# Patient Record
Sex: MALE | Race: WHITE | NOT HISPANIC OR LATINO | URBAN - NONMETROPOLITAN AREA
[De-identification: names, ages, dates, MRNs, and addresses within clinical notes are randomized per-mention and may not be internally consistent; named-entity substitution may affect disease eponyms.]

---

## 2021-11-29 PROCEDURE — U0004 COV-19 TEST NON-CDC HGH THRU: HCPCS | Performed by: EMERGENCY MEDICINE

## 2024-06-13 ENCOUNTER — LAB (OUTPATIENT)
Dept: LAB | Facility: HOSPITAL | Age: 39
End: 2024-06-13
Payer: COMMERCIAL

## 2024-06-13 ENCOUNTER — OFFICE VISIT (OUTPATIENT)
Dept: PSYCHIATRY | Facility: CLINIC | Age: 39
End: 2024-06-13
Payer: COMMERCIAL

## 2024-06-13 VITALS
DIASTOLIC BLOOD PRESSURE: 78 MMHG | BODY MASS INDEX: 23.79 KG/M2 | HEIGHT: 68 IN | WEIGHT: 157 LBS | OXYGEN SATURATION: 99 % | HEART RATE: 96 BPM | SYSTOLIC BLOOD PRESSURE: 118 MMHG

## 2024-06-13 DIAGNOSIS — Z78.9 ALCOHOL USE: ICD-10-CM

## 2024-06-13 DIAGNOSIS — F15.21 METHAMPHETAMINE USE DISORDER, SEVERE, IN SUSTAINED REMISSION: ICD-10-CM

## 2024-06-13 DIAGNOSIS — F12.10 CANNABIS USE DISORDER, MILD, ABUSE: ICD-10-CM

## 2024-06-13 DIAGNOSIS — F11.21 OPIOID USE DISORDER, SEVERE, IN SUSTAINED REMISSION: ICD-10-CM

## 2024-06-13 LAB
AMPHET+METHAMPHET UR QL: NEGATIVE
AMPHETAMINES UR QL: NEGATIVE
BARBITURATES UR QL SCN: NEGATIVE
BENZODIAZ UR QL SCN: NEGATIVE
BUPRENORPHINE SERPL-MCNC: NEGATIVE NG/ML
CANNABINOIDS SERPL QL: NEGATIVE
COCAINE UR QL: NEGATIVE
METHADONE UR QL SCN: NEGATIVE
OPIATES UR QL: NEGATIVE
OXYCODONE UR QL SCN: NEGATIVE
PCP UR QL SCN: NEGATIVE
TRICYCLICS UR QL SCN: NEGATIVE

## 2024-06-13 PROCEDURE — 80306 DRUG TEST PRSMV INSTRMNT: CPT
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
## 2024-06-18 LAB — REF LAB TEST METHOD: NORMAL

## 2024-06-20 ENCOUNTER — LAB (OUTPATIENT)
Dept: LAB | Facility: HOSPITAL | Age: 39
End: 2024-06-20
Payer: COMMERCIAL

## 2024-06-20 ENCOUNTER — OFFICE VISIT (OUTPATIENT)
Dept: PSYCHIATRY | Facility: CLINIC | Age: 39
End: 2024-06-20
Payer: COMMERCIAL

## 2024-06-20 DIAGNOSIS — F12.10 CANNABIS USE DISORDER, MILD, ABUSE: ICD-10-CM

## 2024-06-20 DIAGNOSIS — F11.21 OPIOID USE DISORDER, SEVERE, IN SUSTAINED REMISSION: Primary | ICD-10-CM

## 2024-06-20 DIAGNOSIS — F15.21 METHAMPHETAMINE USE DISORDER, SEVERE, IN SUSTAINED REMISSION: ICD-10-CM

## 2024-06-20 DIAGNOSIS — Z78.9 ALCOHOL USE: ICD-10-CM

## 2024-06-20 DIAGNOSIS — F12.10 MILD CANNABIS USE DISORDER: ICD-10-CM

## 2024-06-20 DIAGNOSIS — F11.21 OPIOID USE DISORDER, SEVERE, IN SUSTAINED REMISSION: ICD-10-CM

## 2024-06-20 PROCEDURE — 90791 PSYCH DIAGNOSTIC EVALUATION: CPT | Performed by: SOCIAL WORKER

## 2024-06-20 PROCEDURE — 80306 DRUG TEST PRSMV INSTRMNT: CPT
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
## 2024-06-27 ENCOUNTER — DOCUMENTATION (OUTPATIENT)
Dept: PSYCHIATRY | Facility: CLINIC | Age: 39
End: 2024-06-27
Payer: COMMERCIAL

## 2024-06-27 ENCOUNTER — TELEPHONE (OUTPATIENT)
Dept: PSYCHIATRY | Facility: CLINIC | Age: 39
End: 2024-06-27
Payer: COMMERCIAL

## 2024-06-27 NOTE — PROGRESS NOTES
Spoke with pt. Clinical team is allowing him to start in Phase 2 of IOP. Will start Monday 7/1/2024 in 4:30 group.

## 2024-06-27 NOTE — TELEPHONE ENCOUNTER
Gadiel called in about his UDS results, and to see when he is able to start IOP. He has asked for a return call.

## 2024-07-01 ENCOUNTER — OFFICE VISIT (OUTPATIENT)
Dept: PSYCHIATRY | Facility: CLINIC | Age: 39
End: 2024-07-01
Payer: COMMERCIAL

## 2024-07-01 DIAGNOSIS — Z78.9 ALCOHOL USE: ICD-10-CM

## 2024-07-01 DIAGNOSIS — F15.21 METHAMPHETAMINE USE DISORDER, SEVERE, IN SUSTAINED REMISSION: ICD-10-CM

## 2024-07-01 DIAGNOSIS — F12.10 MILD CANNABIS USE DISORDER: ICD-10-CM

## 2024-07-01 DIAGNOSIS — F11.21 OPIOID USE DISORDER, SEVERE, IN SUSTAINED REMISSION: Primary | ICD-10-CM

## 2024-07-01 PROCEDURE — 90853 GROUP PSYCHOTHERAPY: CPT | Performed by: COUNSELOR

## 2024-07-02 ENCOUNTER — LAB (OUTPATIENT)
Dept: LAB | Facility: HOSPITAL | Age: 39
End: 2024-07-02
Payer: COMMERCIAL

## 2024-07-02 DIAGNOSIS — F12.10 CANNABIS USE DISORDER, MILD, ABUSE: ICD-10-CM

## 2024-07-02 DIAGNOSIS — Z78.9 ALCOHOL USE: ICD-10-CM

## 2024-07-02 DIAGNOSIS — F11.21 OPIOID USE DISORDER, SEVERE, IN SUSTAINED REMISSION: ICD-10-CM

## 2024-07-02 DIAGNOSIS — F15.21 METHAMPHETAMINE USE DISORDER, SEVERE, IN SUSTAINED REMISSION: ICD-10-CM

## 2024-07-02 PROCEDURE — 80306 DRUG TEST PRSMV INSTRMNT: CPT

## 2024-07-02 NOTE — PROGRESS NOTES
"[facility] PHASE II / Phase III GROUP NOTE    Name: Gadiel Short  Date: July 1, 2024    Time in: 4:30     Time out:?5:30     Participants: 12          Data: 1?hour group therapy session (Check ins and interviewing you past and future, and coping skills)      Clinical Maneuvering/Interventions:?Therapist utilized a person-centered, Motivation interviewing, and CBT approaches to build rapport, exploring and resolving ambivalence associated with commitment to change behaviors related to substance use with and addiction, group member.?Therapist implemented motivational interviewing techniques to assist client with, facilitate identification of maladaptive patterns of thinking and behavior that contribute to client's risk for continued substance use and relapse.?Therapist employed group interaction activities to build rapport among group members, promote sobriety, and emphasize relapse prevention.?Therapist promoted a safe nonjudgmental environment by providing group members with unconditional positive regard and encouraging group members to comply with group rules and guidelines. Therapist assisted group member with identifying and implementing healthier coping strategies.      Response:?Patient attended class in person. Patient participated in completion of check in form. Patient on check in form answered no to question \"currently or since last group meeting,?have you had any suicidal thoughts or plan or intent to hurt yourself”, and patient also answered no to question of \"currently or since your last group meeting, have you had any homicidal thoughts or plan or intent to hurt others\". Patient participated in group discussion by identifying a coping skill and identifying goals that he is trying to accomplish.    On a 1:10 Scale (0-none, 10-high):    Anxiety: 8  Depression: 0  Cravings: 0    Assessment     Diagnoses and all orders for this visit:    1. Opioid use disorder, severe, in sustained remission (Primary)    2. " Methamphetamine use disorder, severe, in sustained remission    3. Mild cannabis use disorder    4. Alcohol use             Progress toward goal: At goal    Functional Status: Moderate impairment     Prognosis: Good with Ongoing Treatment     Mental Status Exam:   Hygiene:   good  Cooperation:  Cooperative  Eye Contact:  Good  Psychomotor Behavior:  Appropriate  Affect:  Appropriate  Mood: anxious  Speech:  Normal  Thought Process:  Linear  Thought Content:  Mood congruent  Suicidal:  None  Homicidal:  None  Hallucinations:  None  Delusion:  None  Memory:  Intact  Orientation:  Person, Place, Time, and Situation  Reliability:  fair  Insight:  Fair  Judgement:  Fair  Impulse Control:  Fair  Physical/Medical Issues:  No      Assessment:  Engaged in activity/Process and self-disclosed: Yes  Affect:Appropriate   Applies topic to self: Yes  Able to give and receive feedback: Yes    Urinalysis: Negative  on prelim result dated 06/20/2024  Labs:   Last Urine Toxicity  More data may exist         Latest Ref Rng & Units 6/20/2024 6/13/2024   LAST URINE TOXICITY RESULTS   Amphetamine, Urine Qual Negative Negative  Negative    Barbiturates Screen, Urine Negative Negative  Negative    Benzodiazepine Screen, Urine Negative Negative  Negative    Buprenorphine, Screen, Urine Negative Negative  Negative    Cocaine Screen, Urine Negative Negative  Negative    Methadone Screen , Urine Negative Negative  Negative    Methamphetamine, Ur Negative Negative  Negative         Self-Reported days since last use: 70 plus days    12-Step attendance: 0 meetings    Plan:   Patient will continue in weekly group psychotherapy sessions and individual outpatient psychotherapy sessions every 3-4 weeks and will continue in self-help meetings and seek additional treatment if necessary following completion.    Patient will continue in IOP Phase two and three group.      Safety plan has previously been discussed with patient.     Xu Garcia,  CINTHYA  [unfilled]  18:27 EDT

## 2024-07-05 LAB — REF LAB TEST METHOD: NORMAL

## 2024-07-08 ENCOUNTER — DOCUMENTATION (OUTPATIENT)
Dept: PSYCHIATRY | Facility: HOSPITAL | Age: 39
End: 2024-07-08
Payer: COMMERCIAL

## 2024-07-08 ENCOUNTER — OFFICE VISIT (OUTPATIENT)
Dept: PSYCHIATRY | Facility: CLINIC | Age: 39
End: 2024-07-08
Payer: COMMERCIAL

## 2024-07-08 DIAGNOSIS — Z78.9 ALCOHOL USE: ICD-10-CM

## 2024-07-08 DIAGNOSIS — F15.21 METHAMPHETAMINE USE DISORDER, SEVERE, IN SUSTAINED REMISSION: ICD-10-CM

## 2024-07-08 DIAGNOSIS — F11.21 OPIOID USE DISORDER, SEVERE, IN SUSTAINED REMISSION: Primary | ICD-10-CM

## 2024-07-08 DIAGNOSIS — F12.10 MILD CANNABIS USE DISORDER: ICD-10-CM

## 2024-07-08 LAB — REF LAB TEST METHOD: NORMAL

## 2024-07-08 PROCEDURE — 90853 GROUP PSYCHOTHERAPY: CPT | Performed by: COUNSELOR

## 2024-07-08 NOTE — PROGRESS NOTES
Patient completed CD-IOP Phase I intake on 6/20/2024. On 6/24/2024, patient completed a UDS which showed negative results. Patient at this time will be allowed to start in CD-IOP Phase II at [facility]. Should it be needed in the future, patient will be allowed to be reassessed for CD-IOP Phase I with a new intake.     -Fredrick Suazo LCSW.   7/8/2024.

## 2024-07-09 NOTE — PROGRESS NOTES
"[facility] PHASE II / Phase III GROUP NOTE    Name: Gadiel Short  Date: July 8, 2024    Time in:?4:30   Time out:?5:30   Participants: 10     Data: 1?hour group therapy session (Check ins and self sabotage)      Clinical Maneuvering/Interventions:?Therapist utilized a person-centered, Motivation interviewing, and CBT approaches to build rapport, exploring and resolving ambivalence associated with commitment to change behaviors related to substance use with and addiction, group member.?Therapist implemented motivational interviewing techniques to assist client with, facilitate identification of maladaptive patterns of thinking and behavior that contribute to client's risk for continued substance use and relapse.?Therapist employed group interaction activities to build rapport among group members, promote sobriety, and emphasize relapse prevention.?Therapist promoted a safe nonjudgmental environment by providing group members with unconditional positive regard and encouraging group members to comply with group rules and guidelines. Therapist assisted group members with identifying and implementing healthier coping strategies.       Response:?Patient attended class in person. Patient participated in completion of check in form. Patient on check in form answered no to question \"currently or since last group meeting,?have you had any suicidal thoughts or plan or intent to hurt yourself”, and patient also answered no to question of \"currently or since your last group meeting, have you had any homicidal thoughts or plan or intent to hurt others\".  Patient participated fully in group discussion by openly sharing times that he struggled with self-sabotaging behaviors and providing insight to situations that he has to overcome in his personal life.  Patient identified some coping skills that he is utilizing to help him in growing in recovery.    On a 1:10 Scale (0-none, 10-high):    Anxiety: 2  Depression: 0  Cravings: " 0    Assessment     Diagnoses and all orders for this visit:    1. Opioid use disorder, severe, in sustained remission (Primary)    2. Methamphetamine use disorder, severe, in sustained remission    3. Mild cannabis use disorder    4. Alcohol use             Progress toward goal: At goal    Functional Status: Moderate impairment     Prognosis: Good with Ongoing Treatment     Mental Status Exam:   Hygiene:   good  Cooperation:  Cooperative  Eye Contact:  Good  Psychomotor Behavior:  Appropriate  Affect:  Appropriate  Mood: normal  Speech:  Normal  Thought Process:  Linear  Thought Content:  Mood congruent  Suicidal:  None  Homicidal:  None  Hallucinations:  None  Delusion:  None  Memory:  Intact  Orientation:  Person, Place, Time, and Situation  Reliability:  fair  Insight:  Fair  Judgement:  Fair  Impulse Control:  Fair  Physical/Medical Issues:  No      Assessment:  Engaged in activity/Process and self-disclosed: Yes  Affect:Appropriate   Applies topic to self: Yes  Able to give and receive feedback: Yes    Urinalysis: Negative on confirmation result dated 7/2/2024  Labs:   Last Urine Toxicity  More data exists         Latest Ref Rng & Units 7/2/2024 6/20/2024   LAST URINE TOXICITY RESULTS   Amphetamine, Urine Qual Negative Negative  Negative    Barbiturates Screen, Urine Negative Negative  Negative    Benzodiazepine Screen, Urine Negative Negative  Negative    Buprenorphine, Screen, Urine Negative Negative  Negative    Cocaine Screen, Urine Negative Negative  Negative    Methadone Screen , Urine Negative Negative  Negative    Methamphetamine, Ur Negative Negative  Negative         Self-Reported days since last use: 5 years for other substances and 100 days for alcohol    12-Step attendance: 0 meetings    Plan:   Patient will continue in weekly group psychotherapy sessions and individual outpatient psychotherapy sessions every 3-4 weeks and will continue in self-help meetings and seek additional treatment if  necessary following completion.    Patient will continue in IOP Phase two and three group.      Safety plan has previously been discussed with patient.     CINTHYA Healy  [unfilled]  10:43 EDT

## 2024-07-11 ENCOUNTER — LAB (OUTPATIENT)
Dept: LAB | Facility: HOSPITAL | Age: 39
End: 2024-07-11
Payer: COMMERCIAL

## 2024-07-11 ENCOUNTER — OFFICE VISIT (OUTPATIENT)
Dept: PSYCHIATRY | Facility: CLINIC | Age: 39
End: 2024-07-11
Payer: COMMERCIAL

## 2024-07-11 DIAGNOSIS — F11.21 OPIOID USE DISORDER, SEVERE, IN SUSTAINED REMISSION: ICD-10-CM

## 2024-07-11 DIAGNOSIS — F15.21 METHAMPHETAMINE USE DISORDER, SEVERE, IN SUSTAINED REMISSION: ICD-10-CM

## 2024-07-11 DIAGNOSIS — F11.21 OPIOID USE DISORDER, SEVERE, IN SUSTAINED REMISSION: Primary | ICD-10-CM

## 2024-07-11 DIAGNOSIS — Z78.9 ALCOHOL USE: ICD-10-CM

## 2024-07-11 DIAGNOSIS — F12.10 CANNABIS USE DISORDER, MILD, ABUSE: ICD-10-CM

## 2024-07-11 PROCEDURE — 90853 GROUP PSYCHOTHERAPY: CPT | Performed by: COUNSELOR

## 2024-07-11 PROCEDURE — 80306 DRUG TEST PRSMV INSTRMNT: CPT

## 2024-07-11 NOTE — PROGRESS NOTES
"[facility] PHASE II / Phase III GROUP NOTE    Name: Gadiel Short  Date: July 11, 2024    Time in:?4:30   Time out:?5:30   Participants: 9     Data: 1?hour group therapy session (Check ins, anxiety activity, and coping skills)      Clinical Maneuvering/Interventions:?Therapist utilized a person-centered, Motivation interviewing, and CBT approaches to build rapport, exploring and resolving ambivalence associated with commitment to change behaviors related to substance use with and addiction, group member.?Therapist implemented motivational interviewing techniques to assist client with, facilitate identification of maladaptive patterns of thinking and behavior that contribute to client's risk for continued substance use and relapse.?Therapist employed group interaction activities to build rapport among group members, promote sobriety, and emphasize relapse prevention.?Therapist promoted a safe nonjudgmental environment by providing group members with unconditional positive regard and encouraging group members to comply with group rules and guidelines. Therapist assisted group members with identifying and implementing healthier coping strategies.       Response:?Patient attended class in person. Patient participated in completion of check in form. Patient on check in form answered no to question \"currently or since last group meeting,?have you had any suicidal thoughts or plan or intent to hurt yourself”, and patient also answered no to question of \"currently or since your last group meeting, have you had any homicidal thoughts or plan or intent to hurt others\". Patient openly shared some anxiety triggers and coping skills.    On a 1:10 Scale (0-none, 10-high):    Anxiety: 0  Depression: 1  Cravings: 0    Assessment     Diagnoses and all orders for this visit:    1. Opioid use disorder, severe, in sustained remission (Primary)    2. Methamphetamine use disorder, severe, in sustained remission             Progress toward goal: " At goal    Functional Status: Moderate impairment     Prognosis: Good with Ongoing Treatment     Mental Status Exam:   Hygiene:   good  Cooperation:  Cooperative  Eye Contact:  Good  Psychomotor Behavior:  Appropriate  Affect:  Appropriate  Mood: normal  Speech:  Normal  Thought Process:  Linear  Thought Content:  Mood congruent  Suicidal:  None  Homicidal:  None  Hallucinations:  None  Delusion:  None  Memory:  Intact  Orientation:  Person, Place, Time, and Situation  Reliability:  fair  Insight:  Fair  Judgement:  Fair  Impulse Control:  Fair  Physical/Medical Issues:  No      Assessment:  Engaged in activity/Process and self-disclosed: Yes  Affect:Appropriate   Applies topic to self: Yes  Able to give and receive feedback: Yes    Urinalysis: Negative  on prelim result dated 07/11/2024  Labs:   Last Urine Toxicity  More data exists         Latest Ref Rng & Units 7/11/2024 7/2/2024   LAST URINE TOXICITY RESULTS   Amphetamine, Urine Qual Negative Negative  Negative    Barbiturates Screen, Urine Negative Negative  Negative    Benzodiazepine Screen, Urine Negative Negative  Negative    Buprenorphine, Screen, Urine Negative Negative  Negative    Cocaine Screen, Urine Negative Negative  Negative    Methadone Screen , Urine Negative Negative  Negative    Methamphetamine, Ur Negative Negative  Negative         Self-Reported days since last use: over 100 day for alcohol and almost 5 years from drugs    12-Step attendance: 0 meetings    Plan:   Patient will continue in weekly group psychotherapy sessions and individual outpatient psychotherapy sessions every 3-4 weeks and will continue in self-help meetings and seek additional treatment if necessary following completion.    Patient will continue in IOP Phase two and three group.      Safety plan has previously been discussed with patient.     CINTHYA Healy  [unfilled]  18:41 EDT

## 2024-07-15 ENCOUNTER — OFFICE VISIT (OUTPATIENT)
Dept: PSYCHIATRY | Facility: CLINIC | Age: 39
End: 2024-07-15
Payer: COMMERCIAL

## 2024-07-15 ENCOUNTER — LAB (OUTPATIENT)
Dept: LAB | Facility: HOSPITAL | Age: 39
End: 2024-07-15
Payer: COMMERCIAL

## 2024-07-15 DIAGNOSIS — F12.10 MILD CANNABIS USE DISORDER: ICD-10-CM

## 2024-07-15 DIAGNOSIS — Z78.9 ALCOHOL USE: ICD-10-CM

## 2024-07-15 DIAGNOSIS — F15.21 METHAMPHETAMINE USE DISORDER, SEVERE, IN SUSTAINED REMISSION: ICD-10-CM

## 2024-07-15 DIAGNOSIS — F12.10 CANNABIS USE DISORDER, MILD, ABUSE: ICD-10-CM

## 2024-07-15 DIAGNOSIS — F11.21 OPIOID USE DISORDER, SEVERE, IN SUSTAINED REMISSION: Primary | ICD-10-CM

## 2024-07-15 DIAGNOSIS — F11.21 OPIOID USE DISORDER, SEVERE, IN SUSTAINED REMISSION: ICD-10-CM

## 2024-07-15 PROCEDURE — 90853 GROUP PSYCHOTHERAPY: CPT | Performed by: COUNSELOR

## 2024-07-15 PROCEDURE — 80306 DRUG TEST PRSMV INSTRMNT: CPT

## 2024-07-16 NOTE — PROGRESS NOTES
"[facility] [place] PHASE II / Phase III GROUP NOTE    Name: Gadiel Short  Date: July 15, 2024    Time in: 4:30   Time out:?5:30   Participants: 9     Data: 1?hour group therapy session (Check ins, and jeopardy review over fair fighting rules and self-sabotage in addiction)      Clinical Maneuvering/Interventions:?Therapist utilized a person-centered, Motivation interviewing, and CBT approaches to build rapport, exploring and resolving ambivalence associated with commitment to change behaviors related to substance use with and addiction, group member.?Therapist implemented motivational interviewing techniques to assist client with, facilitate identification of maladaptive patterns of thinking and behavior that contribute to client's risk for continued substance use and relapse.?Therapist employed group interaction activities to build rapport among group members, promote sobriety, and emphasize relapse prevention.?Therapist promoted a safe nonjudgmental environment by providing group members with unconditional positive regard and encouraging group members to comply with group rules and guidelines. Therapist assisted group member with identifying and implementing healthier coping strategies.      Response:?Patient attended class in person. Patient participated in completion of check in form. Patient on check in form answered no to question \"currently or since last group meeting,?have you had any suicidal thoughts or plan or intent to hurt yourself”, and patient also answered no to question of \"currently or since your last group meeting, have you had any homicidal thoughts or plan or intent to hurt others\".     On a 1:10 Scale (0-none, 10-high):    Anxiety: 5  Depression: 0  Cravings: 0    Assessment     Diagnoses and all orders for this visit:    1. Opioid use disorder, severe, in sustained remission (Primary)    2. Methamphetamine use disorder, severe, in sustained remission    3. Mild cannabis use disorder    4. Alcohol use         "     Progress toward goal: At goal    Functional Status: Moderate impairment     Prognosis: Good with Ongoing Treatment     Mental Status Exam:   Hygiene:   good  Cooperation:  Cooperative  Eye Contact:  Good  Psychomotor Behavior:  Appropriate  Affect:  Appropriate  Mood: normal  Speech:  Normal  Thought Process:  Linear  Thought Content:  Mood congruent  Suicidal:  None  Homicidal:  None  Hallucinations:  None  Delusion:  None  Memory:  Intact  Orientation:  Person, Place, Time, and Situation  Reliability:  fair  Insight:  Fair  Judgement:  Fair  Impulse Control:  Fair  Physical/Medical Issues:  No      Assessment:  Engaged in activity/Process and self-disclosed: Yes  Affect:Appropriate   Applies topic to self: Yes  Able to give and receive feedback: Yes    Urinalysis: Negative on prelim result dated 7/15/2024  Labs:   Last Urine Toxicity  More data exists         Latest Ref Rng & Units 7/15/2024 7/11/2024   LAST URINE TOXICITY RESULTS   Amphetamine, Urine Qual Negative Negative  Negative    Barbiturates Screen, Urine Negative Negative  Negative    Benzodiazepine Screen, Urine Negative Negative  Negative    Buprenorphine, Screen, Urine Negative Negative  Negative    Cocaine Screen, Urine Negative Negative  Negative    Methadone Screen , Urine Negative Negative  Negative    Methamphetamine, Ur Negative Negative  Negative         Self-Reported days since last use: 100+ days from alcohol    12-Step attendance: 0 meetings    Plan:   Patient will continue in weekly group psychotherapy sessions and individual outpatient psychotherapy sessions every 3-4 weeks and will continue in self-help meetings and seek additional treatment if necessary following completion.    Patient will continue in IOP Phase two and three group.      Safety plan has previously been discussed with patient.     CINTHYA Healy  [unfilled]  09:23 EDT

## 2024-07-17 LAB — REF LAB TEST METHOD: NORMAL

## 2024-07-18 ENCOUNTER — OFFICE VISIT (OUTPATIENT)
Dept: PSYCHIATRY | Facility: CLINIC | Age: 39
End: 2024-07-18
Payer: COMMERCIAL

## 2024-07-18 DIAGNOSIS — F11.21 OPIOID USE DISORDER, SEVERE, IN SUSTAINED REMISSION: Primary | ICD-10-CM

## 2024-07-18 DIAGNOSIS — Z78.9 ALCOHOL USE: ICD-10-CM

## 2024-07-18 DIAGNOSIS — F12.10 MILD CANNABIS USE DISORDER: ICD-10-CM

## 2024-07-18 DIAGNOSIS — F15.21 METHAMPHETAMINE USE DISORDER, SEVERE, IN SUSTAINED REMISSION: ICD-10-CM

## 2024-07-18 PROCEDURE — 90853 GROUP PSYCHOTHERAPY: CPT | Performed by: COUNSELOR

## 2024-07-18 NOTE — PROGRESS NOTES
"[facility] PHASE II / Phase III GROUP NOTE    Name: Gadiel Short  Date: July 18, 2024    Time in: 4:30   Time out:?5:30   Participants: 10    Data: 1?hour group therapy session (Check ins, and thoughts, feelings, and actions triangle activity)     Clinical Maneuvering/Interventions:?Therapist utilized a person-centered, Motivation interviewing, and CBT approaches to build rapport, exploring and resolving ambivalence associated with commitment to change behaviors related to substance use with and addiction, group member.?Therapist implemented motivational interviewing techniques to assist client with, facilitate identification of maladaptive patterns of thinking and behavior that contribute to client's risk for continued substance use and relapse.?Therapist employed group interaction activities to build rapport among group members, promote sobriety, and emphasize relapse prevention.?Therapist promoted a safe nonjudgmental environment by providing group members with unconditional positive regard and encouraging group members to comply with group rules and guidelines. Therapist assisted group member with identifying and implementing healthier coping strategies.      Response:?Patient attended class in person. Patient participated in completion of check in form. Patient on check in form answered no to question \"currently or since last group meeting,?have you had any suicidal thoughts or plan or intent to hurt yourself”, and patient also answered no to question of \"currently or since your last group meeting, have you had any homicidal thoughts or plan or intent to hurt others\". Patient openly shared different thoughts, feelings, and actions that he experiences. Patient identified words of affirmations are helpful to having more positive thoughts, feelings, and actions.    On a 1:10 Scale (0-none, 10-high):    Anxiety: 0  Depression: 0  Cravings: 0    Assessment     Diagnoses and all orders for this visit:    1. Opioid use " disorder, severe, in sustained remission (Primary)    2. Methamphetamine use disorder, severe, in sustained remission    3. Mild cannabis use disorder    4. Alcohol use             Progress toward goal: At goal    Functional Status: Moderate impairment     Prognosis: Good with Ongoing Treatment     Mental Status Exam:   Hygiene:   good  Cooperation:  Cooperative  Eye Contact:  Good  Psychomotor Behavior:  Appropriate  Affect:  Appropriate  Mood: normal  Speech:  Normal  Thought Process:  Linear  Thought Content:  Mood congruent  Suicidal:  None  Homicidal:  None  Hallucinations:  None  Delusion:  None  Memory:  Intact  Orientation:  Person, Place, Time, and Situation  Reliability:  fair  Insight:  Fair  Judgement:  Fair  Impulse Control:  Fair  Physical/Medical Issues:  No      Assessment:  Engaged in activity/Process and self-disclosed: Yes  Affect:Appropriate   Applies topic to self: Yes  Able to give and receive feedback: Yes    Urinalysis: Negative on prelim result dated 07/15/2024  Labs:   Last Urine Toxicity  More data exists         Latest Ref Rng & Units 7/15/2024 7/11/2024   LAST URINE TOXICITY RESULTS   Amphetamine, Urine Qual Negative Negative  Negative    Barbiturates Screen, Urine Negative Negative  Negative    Benzodiazepine Screen, Urine Negative Negative  Negative    Buprenorphine, Screen, Urine Negative Negative  Negative    Cocaine Screen, Urine Negative Negative  Negative    Methadone Screen , Urine Negative Negative  Negative    Methamphetamine, Ur Negative Negative  Negative         Self-Reported days since last use: 100+ days from alcohol    12-Step attendance: 0 meetings    Plan:   Patient will continue in weekly group psychotherapy sessions and individual outpatient psychotherapy sessions every 3-4 weeks and will continue in self-help meetings and seek additional treatment if necessary following completion.    Patient will continue in IOP Phase two and three group.      Safety plan has  previously been discussed with patient.     CINTHYA Healy  [unfilled]  19:30 EDT

## 2024-07-22 ENCOUNTER — OFFICE VISIT (OUTPATIENT)
Dept: PSYCHIATRY | Facility: CLINIC | Age: 39
End: 2024-07-22
Payer: COMMERCIAL

## 2024-07-22 DIAGNOSIS — F11.21 OPIOID USE DISORDER, SEVERE, IN SUSTAINED REMISSION: Primary | ICD-10-CM

## 2024-07-22 DIAGNOSIS — F15.21 METHAMPHETAMINE USE DISORDER, SEVERE, IN SUSTAINED REMISSION: ICD-10-CM

## 2024-07-22 DIAGNOSIS — F12.10 MILD CANNABIS USE DISORDER: ICD-10-CM

## 2024-07-22 DIAGNOSIS — Z78.9 ALCOHOL USE: ICD-10-CM

## 2024-07-22 LAB — REF LAB TEST METHOD: NORMAL

## 2024-07-22 PROCEDURE — 90853 GROUP PSYCHOTHERAPY: CPT | Performed by: COUNSELOR

## 2024-07-23 NOTE — PROGRESS NOTES
"[facility] PHASE II / Phase III GROUP NOTE    Name: Gadiel Short  Date: July 22, 2024    Time in: 4:30   Time out:?5:30   Participants: 11     Data: 1?hour group therapy session (Check ins, and thoughts, feelings, and actions triangle activity)     Clinical Maneuvering/Interventions:?Therapist utilized a person-centered, Motivation interviewing, and CBT approaches to build rapport, exploring and resolving ambivalence associated with commitment to change behaviors related to substance use with and addiction, group member.?Therapist implemented motivational interviewing techniques to assist client with, facilitate identification of maladaptive patterns of thinking and behavior that contribute to client's risk for continued substance use and relapse.?Therapist employed group interaction activities to build rapport among group members, promote sobriety, and emphasize relapse prevention.?Therapist promoted a safe nonjudgmental environment by providing group members with unconditional positive regard and encouraging group members to comply with group rules and guidelines. Therapist assisted group member with identifying and implementing healthier coping strategies.      Response:?Patient attended class in person. Patient participated in completion of check in form. Patient on check in form answered no to question \"currently or since last group meeting,?have you had any suicidal thoughts or plan or intent to hurt yourself”, and patient also answered no to question of \"currently or since your last group meeting, have you had any homicidal thoughts or plan or intent to hurt others\". Patient openly shared thoughts about the video and what actions steps that can assist with improving skills in recovery and building connection.     On a 1:10 Scale (0-none, 10-high):    Anxiety: 0  Depression: 0  Cravings: 0    Assessment     Diagnoses and all orders for this visit:    1. Opioid use disorder, severe, in sustained remission " (Primary)    2. Methamphetamine use disorder, severe, in sustained remission    3. Mild cannabis use disorder    4. Alcohol use             Progress toward goal: At goal    Functional Status: Moderate impairment     Prognosis: Good with Ongoing Treatment     Mental Status Exam:   Hygiene:   good  Cooperation:  Cooperative  Eye Contact:  Good  Psychomotor Behavior:  Appropriate  Affect:  Appropriate  Mood: normal  Speech:  Normal  Thought Process:  Linear  Thought Content:  Mood congruent  Suicidal:  None  Homicidal:  None  Hallucinations:  None  Delusion:  None  Memory:  Intact  Orientation:  Person, Place, Time, and Situation  Reliability:  fair  Insight:  Fair  Judgement:  Fair  Impulse Control:  Fair  Physical/Medical Issues:  No      Assessment:  Engaged in activity/Process and self-disclosed: Yes  Affect:Appropriate   Applies topic to self: Yes  Able to give and receive feedback: Yes    Urinalysis: Negative on confirmation result dated 7/15/2024  Labs:   Last Urine Toxicity  More data exists         Latest Ref Rng & Units 7/15/2024 7/11/2024   LAST URINE TOXICITY RESULTS   Amphetamine, Urine Qual Negative Negative  Negative    Barbiturates Screen, Urine Negative Negative  Negative    Benzodiazepine Screen, Urine Negative Negative  Negative    Buprenorphine, Screen, Urine Negative Negative  Negative    Cocaine Screen, Urine Negative Negative  Negative    Methadone Screen , Urine Negative Negative  Negative    Methamphetamine, Ur Negative Negative  Negative         Self-Reported days since last use: 100+ from alcohol    12-Step attendance: 0 meetings    Plan:   Patient will continue in weekly group psychotherapy sessions and individual outpatient psychotherapy sessions every 3-4 weeks and will continue in self-help meetings and seek additional treatment if necessary following completion.    Patient will continue in IOP Phase two and three group.      Safety plan has previously been discussed with patient.      Xu Garcia, CINTHYA  [unfilled]  07:24 EDT

## 2024-07-25 ENCOUNTER — LAB (OUTPATIENT)
Dept: LAB | Facility: HOSPITAL | Age: 39
End: 2024-07-25
Payer: COMMERCIAL

## 2024-07-25 ENCOUNTER — OFFICE VISIT (OUTPATIENT)
Dept: PSYCHIATRY | Facility: CLINIC | Age: 39
End: 2024-07-25
Payer: COMMERCIAL

## 2024-07-25 DIAGNOSIS — F11.21 OPIOID USE DISORDER, SEVERE, IN SUSTAINED REMISSION: Primary | ICD-10-CM

## 2024-07-25 DIAGNOSIS — Z78.9 ALCOHOL USE: ICD-10-CM

## 2024-07-25 DIAGNOSIS — F12.10 CANNABIS USE DISORDER, MILD, ABUSE: ICD-10-CM

## 2024-07-25 DIAGNOSIS — F15.21 METHAMPHETAMINE USE DISORDER, SEVERE, IN SUSTAINED REMISSION: ICD-10-CM

## 2024-07-25 DIAGNOSIS — F11.21 OPIOID USE DISORDER, SEVERE, IN SUSTAINED REMISSION: ICD-10-CM

## 2024-07-25 DIAGNOSIS — F12.10 MILD CANNABIS USE DISORDER: ICD-10-CM

## 2024-07-25 PROCEDURE — 80306 DRUG TEST PRSMV INSTRMNT: CPT

## 2024-07-25 PROCEDURE — 90853 GROUP PSYCHOTHERAPY: CPT | Performed by: COUNSELOR

## 2024-07-29 ENCOUNTER — OFFICE VISIT (OUTPATIENT)
Dept: PSYCHIATRY | Facility: CLINIC | Age: 39
End: 2024-07-29
Payer: COMMERCIAL

## 2024-07-29 ENCOUNTER — LAB (OUTPATIENT)
Dept: LAB | Facility: HOSPITAL | Age: 39
End: 2024-07-29
Payer: COMMERCIAL

## 2024-07-29 DIAGNOSIS — F15.21 METHAMPHETAMINE USE DISORDER, SEVERE, IN SUSTAINED REMISSION: ICD-10-CM

## 2024-07-29 DIAGNOSIS — F11.21 OPIOID USE DISORDER, SEVERE, IN SUSTAINED REMISSION: Primary | ICD-10-CM

## 2024-07-29 DIAGNOSIS — F12.10 MILD CANNABIS USE DISORDER: ICD-10-CM

## 2024-07-29 DIAGNOSIS — F11.21 OPIOID USE DISORDER, SEVERE, IN SUSTAINED REMISSION: ICD-10-CM

## 2024-07-29 DIAGNOSIS — Z78.9 ALCOHOL USE: ICD-10-CM

## 2024-07-29 DIAGNOSIS — F12.10 CANNABIS USE DISORDER, MILD, ABUSE: ICD-10-CM

## 2024-07-29 LAB
AMPHET+METHAMPHET UR QL: NEGATIVE
AMPHETAMINES UR QL: NEGATIVE
BARBITURATES UR QL SCN: NEGATIVE
BENZODIAZ UR QL SCN: NEGATIVE
BUPRENORPHINE SERPL-MCNC: NEGATIVE NG/ML
CANNABINOIDS SERPL QL: NEGATIVE
COCAINE UR QL: NEGATIVE
METHADONE UR QL SCN: NEGATIVE
OPIATES UR QL: NEGATIVE
OXYCODONE UR QL SCN: NEGATIVE
PCP UR QL SCN: NEGATIVE
REF LAB TEST METHOD: NORMAL
TRICYCLICS UR QL SCN: NEGATIVE

## 2024-07-29 PROCEDURE — 90853 GROUP PSYCHOTHERAPY: CPT | Performed by: COUNSELOR

## 2024-07-29 PROCEDURE — 80306 DRUG TEST PRSMV INSTRMNT: CPT

## 2024-07-29 NOTE — PROGRESS NOTES
"[facility] PHASE II / Phase III GROUP NOTE    Name: Gadiel Short  Date: July 25, 2024    Time in: 4:30   Time out:?5:30   Participants: 9     Data: 1?hour group therapy session (Check ins, and connections in recovery)     Clinical Maneuvering/Interventions:?Therapist utilized a person-centered, Motivation interviewing, and CBT approaches to build rapport, exploring and resolving ambivalence associated with commitment to change behaviors related to substance use with and addiction, group member.?Therapist implemented motivational interviewing techniques to assist client with, facilitate identification of maladaptive patterns of thinking and behavior that contribute to client's risk for continued substance use and relapse.?Therapist employed group interaction activities to build rapport among group members, promote sobriety, and emphasize relapse prevention.?Therapist promoted a safe nonjudgmental environment by providing group members with unconditional positive regard and encouraging group members to comply with group rules and guidelines. Therapist assisted group member with identifying and implementing healthier coping strategies.      Response:?Patient attended class in person. Patient participated in completion of check in form. Patient on check in form answered no to question \"currently or since last group meeting,?have you had any suicidal thoughts or plan or intent to hurt yourself”, and patient also answered no to question of \"currently or since your last group meeting, have you had any homicidal thoughts or plan or intent to hurt others\". Patient openly shared thoughts about the video and what actions steps that can assist with improving skills in recovery and building connection. Patient openly shared about the different types of connections in his life. Patient identified actions that he is taking to grow those connections.     On a 1:10 Scale (0-none, 10-high):    Anxiety: 0  Depression: 0  Cravings: " 0    Assessment     Diagnoses and all orders for this visit:    1. Opioid use disorder, severe, in sustained remission (Primary)    2. Methamphetamine use disorder, severe, in sustained remission    3. Mild cannabis use disorder    4. Alcohol use             Progress toward goal: At goal    Functional Status: Moderate impairment     Prognosis: Good with Ongoing Treatment     Mental Status Exam:   Hygiene:   good  Cooperation:  Cooperative  Eye Contact:  Good  Psychomotor Behavior:  Appropriate  Affect:  Appropriate  Mood: normal  Speech:  Normal  Thought Process:  Linear  Thought Content:  Mood congruent  Suicidal:  None  Homicidal:  None  Hallucinations:  None  Delusion:  None  Memory:  Intact  Orientation:  Person, Place, Time, and Situation  Reliability:  fair  Insight:  Fair  Judgement:  Fair  Impulse Control:  Fair  Physical/Medical Issues:  No      Assessment:  Engaged in activity/Process and self-disclosed: Yes  Affect:Appropriate   Applies topic to self: Yes  Able to give and receive feedback: Yes    Urinalysis: Negative on prelim result dated 07/25/2024  Labs:   Last Urine Toxicity  More data exists         Latest Ref Rng & Units 7/25/2024 7/15/2024   LAST URINE TOXICITY RESULTS   Amphetamine, Urine Qual Negative Negative  Negative    Barbiturates Screen, Urine Negative Negative  Negative    Benzodiazepine Screen, Urine Negative Negative  Negative    Buprenorphine, Screen, Urine Negative Negative  Negative    Cocaine Screen, Urine Negative Negative  Negative    Methadone Screen , Urine Negative Negative  Negative    Methamphetamine, Ur Negative Negative  Negative       Details                    Self-Reported days since last use: 100+ days    12-Step attendance: 0 meetings    Plan:   Patient will continue in weekly group psychotherapy sessions and individual outpatient psychotherapy sessions every 3-4 weeks and will continue in self-help meetings and seek additional treatment if necessary following  completion.    Patient will continue in IOP Phase two and three group.      Safety plan has previously been discussed with patient.     CINTHYA Healy  [unfilled]  07:58 EDT

## 2024-07-30 NOTE — PROGRESS NOTES
"[facility] PHASE II / Phase III GROUP NOTE    Name: Gadiel Short  Date: July 29, 2024    Time in: 4:30   Time out:?5:30   Participants: 11     Data: 1?hour group therapy session (Check ins, and reasons why not to use substance activity)     Clinical Maneuvering/Interventions:?Therapist utilized a person-centered, Motivation interviewing, and CBT approaches to build rapport, exploring and resolving ambivalence associated with commitment to change behaviors related to substance use with and addiction, group member.?Therapist implemented motivational interviewing techniques to assist client with, facilitate identification of maladaptive patterns of thinking and behavior that contribute to client's risk for continued substance use and relapse.?Therapist employed group interaction activities to build rapport among group members, promote sobriety, and emphasize relapse prevention.?Therapist promoted a safe nonjudgmental environment by providing group members with unconditional positive regard and encouraging group members to comply with group rules and guidelines. Therapist assisted group member with identifying and implementing healthier coping strategies.      Response:?Patient attended class in person. Patient participated in completion of check in form. Patient on check in form answered no to question \"currently or since last group meeting,?have you had any suicidal thoughts or plan or intent to hurt yourself”, and patient also answered no to question of \"currently or since your last group meeting, have you had any homicidal thoughts or plan or intent to hurt others\". Patient openly shared thoughts about the video and what actions steps that can assist with improving skills in recovery and building connection. Patient openly shared about the reason why not to use activity and to increase their coping skills with reminders on why he is choosing sobriety.     On a 1:10 Scale (0-none, 10-high):    Anxiety: 0  Depression: " 0  Cravings: 0    Assessment     Diagnoses and all orders for this visit:    1. Opioid use disorder, severe, in sustained remission (Primary)    2. Methamphetamine use disorder, severe, in sustained remission    3. Mild cannabis use disorder    4. Alcohol use             Progress toward goal: At goal    Functional Status: Moderate impairment     Prognosis: Good with Ongoing Treatment     Mental Status Exam:   Hygiene:   good  Cooperation:  Cooperative  Eye Contact:  Good  Psychomotor Behavior:  Appropriate  Affect:  Appropriate  Mood: normal  Speech:  Normal  Thought Process:  Linear  Thought Content:  Mood congruent  Suicidal:  None  Homicidal:  None  Hallucinations:  None  Delusion:  None  Memory:  Intact  Orientation:  Person, Place, Time, and Situation  Reliability:  fair  Insight:  Fair  Judgement:  Fair  Impulse Control:  Fair  Physical/Medical Issues:  No      Assessment:  Engaged in activity/Process and self-disclosed: Yes  Affect:Appropriate   Applies topic to self: Yes  Able to give and receive feedback: Yes    Urinalysis: Negative  on prelim result dated 07/29/2024  Labs:   Last Urine Toxicity  More data exists         Latest Ref Rng & Units 7/29/2024 7/25/2024   LAST URINE TOXICITY RESULTS   Amphetamine, Urine Qual Negative Negative  Negative    Barbiturates Screen, Urine Negative Negative  Negative    Benzodiazepine Screen, Urine Negative Negative  Negative    Buprenorphine, Screen, Urine Negative Negative  Negative    Cocaine Screen, Urine Negative Negative  Negative    Methadone Screen , Urine Negative Negative  Negative    Methamphetamine, Ur Negative Negative  Negative       Details                    Self-Reported days since last use: 120 plus days    12-Step attendance: 0 meetings    Plan:   Patient will continue in weekly group psychotherapy sessions and individual outpatient psychotherapy sessions every 3-4 weeks and will continue in self-help meetings and seek additional treatment if necessary  following completion.    Patient will continue in IOP Phase two and three group.      Safety plan has previously been discussed with patient.     CINTHYA Healy  [unfilled]  09:28 EDT

## 2024-07-31 ENCOUNTER — TELEPHONE (OUTPATIENT)
Dept: PSYCHIATRY | Facility: CLINIC | Age: 39
End: 2024-07-31

## 2024-07-31 NOTE — TELEPHONE ENCOUNTER
Patient called in and states that he was not aware appointment had to be on mychart and he did not have the danny. He said that his social worked needed some assessments of a drug and mental health assessment. He said social workers name was Rylee Rogers number was [phone] she had emailed someone before court today and did not get a response and is wanting these assessments for Patient. Please advise

## 2024-07-31 NOTE — TELEPHONE ENCOUNTER
Called Rylee Rogers she said she had faxed a YESENIA on 07/18/2024 I advised her we had not gotten the fax she said she would refax but that she did want to speak personally with Gadiel's provider.    Patient has also been advised he said he would call tomorrow and if we had not got release he would come in office to sign one.

## 2024-08-01 ENCOUNTER — OFFICE VISIT (OUTPATIENT)
Dept: PSYCHIATRY | Facility: CLINIC | Age: 39
End: 2024-08-01
Payer: COMMERCIAL

## 2024-08-01 DIAGNOSIS — F12.10 MILD CANNABIS USE DISORDER: ICD-10-CM

## 2024-08-01 DIAGNOSIS — F15.21 METHAMPHETAMINE USE DISORDER, SEVERE, IN SUSTAINED REMISSION: ICD-10-CM

## 2024-08-01 DIAGNOSIS — F11.21 OPIOID USE DISORDER, SEVERE, IN SUSTAINED REMISSION: Primary | ICD-10-CM

## 2024-08-01 DIAGNOSIS — Z78.9 ALCOHOL USE: ICD-10-CM

## 2024-08-01 PROCEDURE — 90853 GROUP PSYCHOTHERAPY: CPT | Performed by: COUNSELOR

## 2024-08-01 NOTE — PROGRESS NOTES
"[facility] PHASE II / Phase III GROUP NOTE    Name: Gadiel Short  Date: August 1, 2024    Time in: 4:30   Time out:?5:30   Participants: 10     Data: 1?hour group therapy session (Check ins, and forgiveness, Live, Love activity)     Clinical Maneuvering/Interventions:?Therapist utilized a person-centered, Motivation interviewing, and CBT approaches to build rapport, exploring and resolving ambivalence associated with commitment to change behaviors related to substance use with and addiction, group member.?Therapist implemented motivational interviewing techniques to assist client with, facilitate identification of maladaptive patterns of thinking and behavior that contribute to client's risk for continued substance use and relapse.?Therapist employed group interaction activities to build rapport among group members, promote sobriety, and emphasize relapse prevention.?Therapist promoted a safe nonjudgmental environment by providing group members with unconditional positive regard and encouraging group members to comply with group rules and guidelines. Therapist assisted group member with identifying and implementing healthier coping strategies.      Response:?Patient attended class in person. Patient participated in completion of check in form. Patient on check in form answered no to question \"currently or since last group meeting,?have you had any suicidal thoughts or plan or intent to hurt yourself”, and patient also answered no to question of \"currently or since your last group meeting, have you had any homicidal thoughts or plan or intent to hurt others\". Patient openly shared thoughts about the video and what actions steps that can assist with improving skills in recovery and building connection. Patient expressed some anxiety and depression that he has been experiencing. Patient reported that it was due to a difficult time that he is experiencing. Patient identified some supports and actions he taking to maintain his " sobriety. Patient expressed some resources that he is planning on starting to use and requested if he could come in to complete UDS on Tuesday, Thursday, or Friday due to situations his court order action plan he is trying to meet.    On a 1:10 Scale (0-none, 10-high):    Anxiety: 8  Depression: 7  Cravings: 0    Assessment     Diagnoses and all orders for this visit:    1. Opioid use disorder, severe, in sustained remission (Primary)    2. Methamphetamine use disorder, severe, in sustained remission    3. Mild cannabis use disorder    4. Alcohol use             Progress toward goal: At goal    Functional Status: Moderate impairment     Prognosis: Good with Ongoing Treatment     Mental Status Exam:   Hygiene:   good  Cooperation:  Cooperative  Eye Contact:  Good  Psychomotor Behavior:  Appropriate  Affect:  Appropriate  Mood: normal  Speech:  Normal  Thought Process:  Linear  Thought Content:  Mood congruent  Suicidal:  None  Homicidal:  None  Hallucinations:  None  Delusion:  None  Memory:  Intact  Orientation:  Person, Place, Time, and Situation  Reliability:  fair  Insight:  Fair  Judgement:  Fair  Impulse Control:  Fair  Physical/Medical Issues:  No      Assessment:  Engaged in activity/Process and self-disclosed: Yes  Affect:Appropriate   Applies topic to self: Yes  Able to give and receive feedback: Yes    Urinalysis: Negative  on prelim result dated 07/29/2024  Labs:   Last Urine Toxicity  More data exists         Latest Ref Rng & Units 7/29/2024 7/25/2024   LAST URINE TOXICITY RESULTS   Amphetamine, Urine Qual Negative Negative  Negative    Barbiturates Screen, Urine Negative Negative  Negative    Benzodiazepine Screen, Urine Negative Negative  Negative    Buprenorphine, Screen, Urine Negative Negative  Negative    Cocaine Screen, Urine Negative Negative  Negative    Methadone Screen , Urine Negative Negative  Negative    Methamphetamine, Ur Negative Negative  Negative       Details                     Self-Reported days since last use: 120 plus days    12-Step attendance: 0 meetings    Plan:   Patient will continue in weekly group psychotherapy sessions and individual outpatient psychotherapy sessions every 3-4 weeks and will continue in self-help meetings and seek additional treatment if necessary following completion.    Patient will continue in IOP Phase two and three group.      Safety plan has previously been discussed with patient.     CINTHYA Healy  [unfilled]  19:54 EDT

## 2024-08-02 LAB — REF LAB TEST METHOD: NORMAL

## 2024-08-05 ENCOUNTER — OFFICE VISIT (OUTPATIENT)
Dept: PSYCHIATRY | Facility: CLINIC | Age: 39
End: 2024-08-05
Payer: COMMERCIAL

## 2024-08-05 DIAGNOSIS — F15.21 METHAMPHETAMINE USE DISORDER, SEVERE, IN SUSTAINED REMISSION: ICD-10-CM

## 2024-08-05 DIAGNOSIS — F12.10 MILD CANNABIS USE DISORDER: ICD-10-CM

## 2024-08-05 DIAGNOSIS — F11.21 OPIOID USE DISORDER, SEVERE, IN SUSTAINED REMISSION: Primary | ICD-10-CM

## 2024-08-05 DIAGNOSIS — Z78.9 ALCOHOL USE: ICD-10-CM

## 2024-08-05 PROCEDURE — 90853 GROUP PSYCHOTHERAPY: CPT | Performed by: COUNSELOR

## 2024-08-06 NOTE — PROGRESS NOTES
"[facility] PHASE II / Phase III GROUP NOTE    Name: Gadiel Short  Date: August 5, 2024    Time in: 4:30   Time out:?5:30   Participants: 10     Data: 1?hour group therapy session 1?hour group therapy session (Check ins, and forgiveness, Live, Love activity, team building activity finding good in the bad)     Clinical Maneuvering/Interventions:?Therapist utilized a person-centered, Motivation interviewing, and CBT approaches to build rapport, exploring and resolving ambivalence associated with commitment to change behaviors related to substance use with and addiction, group member.?Therapist implemented motivational interviewing techniques to assist client with, facilitate identification of maladaptive patterns of thinking and behavior that contribute to client's risk for continued substance use and relapse.?Therapist employed group interaction activities to build rapport among group members, promote sobriety, and emphasize relapse prevention.?Therapist promoted a safe nonjudgmental environment by providing group members with unconditional positive regard and encouraging group members to comply with group rules and guidelines. Therapist assisted group member with identifying and implementing healthier coping strategies.      Response:?Patient attended class in person. Patient participated in completion of check in form. Patient on check in form answered no to question \"currently or since last group meeting,?have you had any suicidal thoughts or plan or intent to hurt yourself”, and patient also answered no to question of \"currently or since your last group meeting, have you had any homicidal thoughts or plan or intent to hurt others\". Patient openly shared thoughts about the video and what actions steps that can assist with improving skills in recovery and building connection.  Patient was able to discuss times in his life where he has practiced forgiveness to overcome resentments.  Patient was able to identify some good " from bad events in his life.    On a 1:10 Scale (0-none, 10-high):    Anxiety: 5  Depression: 0  Cravings: 0    Assessment     Diagnoses and all orders for this visit:    1. Opioid use disorder, severe, in sustained remission (Primary)    2. Methamphetamine use disorder, severe, in sustained remission    3. Mild cannabis use disorder    4. Alcohol use             Progress toward goal: At goal    Functional Status: Moderate impairment     Prognosis: Good with Ongoing Treatment     Mental Status Exam:   Hygiene:   good  Cooperation:  Cooperative  Eye Contact:  Good  Psychomotor Behavior:  Appropriate  Affect:  Appropriate  Mood: normal  Speech:  Normal  Thought Process:  Linear  Thought Content:  Mood congruent  Suicidal:  None  Homicidal:  None  Hallucinations:  None  Delusion:  None  Memory:  Intact  Orientation:  Person, Place, Time, and Situation  Reliability:  fair  Insight:  Fair  Judgement:  Fair  Impulse Control:  Fair  Physical/Medical Issues:  No      Assessment:  Engaged in activity/Process and self-disclosed: Yes  Affect:Appropriate   Applies topic to self: Yes  Able to give and receive feedback: Yes    Urinalysis: Negative on confirmation result dated 7/29/2024  Labs:   Last Urine Toxicity  More data exists         Latest Ref Rng & Units 7/29/2024 7/25/2024   LAST URINE TOXICITY RESULTS   Amphetamine, Urine Qual Negative Negative  Negative    Barbiturates Screen, Urine Negative Negative  Negative    Benzodiazepine Screen, Urine Negative Negative  Negative    Buprenorphine, Screen, Urine Negative Negative  Negative    Cocaine Screen, Urine Negative Negative  Negative    Methadone Screen , Urine Negative Negative  Negative    Methamphetamine, Ur Negative Negative  Negative       Details                    Self-Reported days since last use: 120+ days    12-Step attendance: 0 meeting    Plan:   Patient will continue in weekly group psychotherapy sessions and individual outpatient psychotherapy sessions every  3-4 weeks and will continue in self-help meetings and seek additional treatment if necessary following completion.    Patient will continue in IOP Phase two and three group.      Safety plan has previously been discussed with patient.     CINTHYA Healy  [unfilled]  07:49 EDT

## 2024-08-08 ENCOUNTER — LAB (OUTPATIENT)
Dept: LAB | Facility: HOSPITAL | Age: 39
End: 2024-08-08
Payer: COMMERCIAL

## 2024-08-08 ENCOUNTER — OFFICE VISIT (OUTPATIENT)
Dept: PSYCHIATRY | Facility: CLINIC | Age: 39
End: 2024-08-08
Payer: COMMERCIAL

## 2024-08-08 DIAGNOSIS — Z78.9 ALCOHOL USE: ICD-10-CM

## 2024-08-08 DIAGNOSIS — F11.21 OPIOID USE DISORDER, SEVERE, IN SUSTAINED REMISSION: ICD-10-CM

## 2024-08-08 DIAGNOSIS — F15.21 METHAMPHETAMINE USE DISORDER, SEVERE, IN SUSTAINED REMISSION: ICD-10-CM

## 2024-08-08 DIAGNOSIS — F12.10 MILD CANNABIS USE DISORDER: ICD-10-CM

## 2024-08-08 DIAGNOSIS — F12.10 CANNABIS USE DISORDER, MILD, ABUSE: ICD-10-CM

## 2024-08-08 DIAGNOSIS — F11.21 OPIOID USE DISORDER, SEVERE, IN SUSTAINED REMISSION: Primary | ICD-10-CM

## 2024-08-08 LAB
AMPHET+METHAMPHET UR QL: NEGATIVE
AMPHETAMINES UR QL: NEGATIVE
BARBITURATES UR QL SCN: NEGATIVE
BENZODIAZ UR QL SCN: NEGATIVE
BUPRENORPHINE SERPL-MCNC: NEGATIVE NG/ML
CANNABINOIDS SERPL QL: POSITIVE
COCAINE UR QL: NEGATIVE
METHADONE UR QL SCN: NEGATIVE
OPIATES UR QL: NEGATIVE
OXYCODONE UR QL SCN: NEGATIVE
PCP UR QL SCN: NEGATIVE
TRICYCLICS UR QL SCN: NEGATIVE

## 2024-08-08 PROCEDURE — 80306 DRUG TEST PRSMV INSTRMNT: CPT

## 2024-08-08 PROCEDURE — 90853 GROUP PSYCHOTHERAPY: CPT | Performed by: COUNSELOR

## 2024-08-09 NOTE — PROGRESS NOTES
"[facility] PHASE II / Phase III GROUP NOTE    Name: Gadiel Short  Date: August 8, 2024    Time in: 4:30   Time out:?5:30   Participants: 9     Data: 1?hour group therapy session 1?hour group therapy session (Check ins, and individual check ins)     Clinical Maneuvering/Interventions:?Therapist utilized a person-centered, Motivation interviewing, and CBT approaches to build rapport, exploring and resolving ambivalence associated with commitment to change behaviors related to substance use with and addiction, group member.?Therapist implemented motivational interviewing techniques to assist client with, facilitate identification of maladaptive patterns of thinking and behavior that contribute to client's risk for continued substance use and relapse.?Therapist employed group interaction activities to build rapport among group members, promote sobriety, and emphasize relapse prevention.?Therapist promoted a safe nonjudgmental environment by providing group members with unconditional positive regard and encouraging group members to comply with group rules and guidelines. Therapist assisted group member with identifying and implementing healthier coping strategies.      Response:?Patient attended class in person. Patient participated in completion of check in form. Patient on check in form answered no to question \"currently or since last group meeting,?have you had any suicidal thoughts or plan or intent to hurt yourself”, and patient also answered no to question of \"currently or since your last group meeting, have you had any homicidal thoughts or plan or intent to hurt others\". Patient openly shared thoughts about the video and what actions steps that can assist with improving skills in recovery and building connection.  Patient is able to identify some growth due to experience that he has had.  Patient identified some supports that he is leaning on to help him through this tough time of working through his case plan.  Patient " reports that he feels overwhelmed but feels confident that he can complete the task ahead of him.    On a 1:10 Scale (0-none, 10-high):    Anxiety: 2  Depression: 0  Cravings: 0    Assessment     Diagnoses and all orders for this visit:    1. Opioid use disorder, severe, in sustained remission (Primary)    2. Methamphetamine use disorder, severe, in sustained remission    3. Mild cannabis use disorder    4. Alcohol use             Progress toward goal: At goal    Functional Status: Moderate impairment     Prognosis: Good with Ongoing Treatment     Mental Status Exam:   Hygiene:   good  Cooperation:  Cooperative  Eye Contact:  Good  Psychomotor Behavior:  Appropriate  Affect:  Appropriate  Mood: normal  Speech:  Normal  Thought Process:  Linear  Thought Content:  Mood congruent  Suicidal:  None  Homicidal:  None  Hallucinations:  None  Delusion:  None  Memory:  Intact  Orientation:  Person, Place, Time, and Situation  Reliability:  fair  Insight:  Fair  Judgement:  Fair  Impulse Control:  Fair  Physical/Medical Issues:  No      Assessment:  Engaged in activity/Process and self-disclosed: Yes  Affect:Appropriate   Applies topic to self: Yes  Able to give and receive feedback: Yes    Urinalysis: Positive THC on Prelone result dated 8/8/2024  Labs:   Last Urine Toxicity  More data exists         Latest Ref Rng & Units 8/8/2024 7/29/2024   LAST URINE TOXICITY RESULTS   Amphetamine, Urine Qual Negative Negative  Negative    Barbiturates Screen, Urine Negative Negative  Negative    Benzodiazepine Screen, Urine Negative Negative  Negative    Buprenorphine, Screen, Urine Negative Negative  Negative    Cocaine Screen, Urine Negative Negative  Negative    Methadone Screen , Urine Negative Negative  Negative    Methamphetamine, Ur Negative Negative  Negative       Details                    Self-Reported days since last use: On patient check-in sheet reports 120+ days    12-Step attendance: On patient check in sheet reports 0  self-help group meetings    Plan:   Patient will continue in weekly group psychotherapy sessions and individual outpatient psychotherapy sessions every 3-4 weeks and will continue in self-help meetings and seek additional treatment if necessary following completion.    Patient will continue in IOP Phase two and three group.      Safety plan has previously been discussed with patient.     CINTHYA Healy  [unfilled]  09:58 EDT

## 2024-08-12 ENCOUNTER — OFFICE VISIT (OUTPATIENT)
Dept: PSYCHIATRY | Facility: CLINIC | Age: 39
End: 2024-08-12
Payer: COMMERCIAL

## 2024-08-12 DIAGNOSIS — Z78.9 ALCOHOL USE: ICD-10-CM

## 2024-08-12 DIAGNOSIS — F12.10 MILD CANNABIS USE DISORDER: ICD-10-CM

## 2024-08-12 DIAGNOSIS — F15.21 METHAMPHETAMINE USE DISORDER, SEVERE, IN SUSTAINED REMISSION: ICD-10-CM

## 2024-08-12 DIAGNOSIS — F11.21 OPIOID USE DISORDER, SEVERE, IN SUSTAINED REMISSION: Primary | ICD-10-CM

## 2024-08-12 PROCEDURE — 90853 GROUP PSYCHOTHERAPY: CPT | Performed by: COUNSELOR

## 2024-08-13 NOTE — PROGRESS NOTES
"[facility] PHASE II / Phase III GROUP NOTE    Name: Gadiel Short  Date: August 12, 2024    Time in: 4:30   Time out:?5:30   Participants: 6     Data: 1?hour group therapy session 1?hour group therapy session (Check ins, and trigger warnings)     Clinical Maneuvering/Interventions:?Therapist utilized a person-centered, Motivation interviewing, and CBT approaches to build rapport, exploring and resolving ambivalence associated with commitment to change behaviors related to substance use with and addiction, group member.?Therapist implemented motivational interviewing techniques to assist client with, facilitate identification of maladaptive patterns of thinking and behavior that contribute to client's risk for continued substance use and relapse.?Therapist employed group interaction activities to build rapport among group members, promote sobriety, and emphasize relapse prevention.?Therapist promoted a safe nonjudgmental environment by providing group members with unconditional positive regard and encouraging group members to comply with group rules and guidelines. Therapist assisted group member with identifying and implementing healthier coping strategies.      Response:?Patient attended class in person. Patient participated in completion of check in form. Patient on check in form answered no to question \"currently or since last group meeting,?have you had any suicidal thoughts or plan or intent to hurt yourself”, and patient also answered no to question of \"currently or since your last group meeting, have you had any homicidal thoughts or plan or intent to hurt others\". Patient openly shared thoughts about the video and what actions steps that can assist with improving skills in recovery and building connection.  Patient was able to identify some triggers and techniques that he is utilizing to overcome them.  Patient reported that he is actively practicing a strong mindset and setting self boundaries to overcome those " triggers.    On a 1:10 Scale (0-none, 10-high):    Anxiety: 0  Depression: 0  Cravings: 0    Assessment     Diagnoses and all orders for this visit:    1. Opioid use disorder, severe, in sustained remission (Primary)    2. Methamphetamine use disorder, severe, in sustained remission    3. Mild cannabis use disorder    4. Alcohol use             Progress toward goal: At goal    Functional Status: Moderate impairment     Prognosis: Good with Ongoing Treatment     Mental Status Exam:   Hygiene:   good  Cooperation:  Cooperative  Eye Contact:  Good  Psychomotor Behavior:  Appropriate  Affect:  Appropriate  Mood: normal  Speech:  Normal  Thought Process:  Linear  Thought Content:  Mood congruent  Suicidal:  None  Homicidal:  None  Hallucinations:  None  Delusion:  None  Memory:  Intact  Orientation:  Person, Place, Time, and Situation  Reliability:  fair  Insight:  Fair  Judgement:  Fair  Impulse Control:  Fair  Physical/Medical Issues:  No      Assessment:  Engaged in activity/Process and self-disclosed: Yes  Affect:Appropriate   Applies topic to self: Yes  Able to give and receive feedback: Yes    Urinalysis: Positive THC on preliminary result dated 08/08/2024  Labs:   Last Urine Toxicity  More data exists         Latest Ref Rng & Units 8/8/2024 7/29/2024   LAST URINE TOXICITY RESULTS   Amphetamine, Urine Qual Negative Negative  Negative    Barbiturates Screen, Urine Negative Negative  Negative    Benzodiazepine Screen, Urine Negative Negative  Negative    Buprenorphine, Screen, Urine Negative Negative  Negative    Cocaine Screen, Urine Negative Negative  Negative    Methadone Screen , Urine Negative Negative  Negative    Methamphetamine, Ur Negative Negative  Negative       Details                    Self-Reported days since last use: Patient check-in sheet reports 120+ days  12-Step attendance: Patient's check-in sheet reports 1 self-help group meeting    Plan:   Patient will continue in weekly group psychotherapy  sessions and individual outpatient psychotherapy sessions every 3-4 weeks and will continue in self-help meetings and seek additional treatment if necessary following completion.    Patient will continue in IOP Phase two and three group.      Safety plan has previously been discussed with patient.     CINTHYA Healy  [unfilled]  15:07 EDT

## 2024-08-15 ENCOUNTER — OFFICE VISIT (OUTPATIENT)
Dept: PSYCHIATRY | Facility: CLINIC | Age: 39
End: 2024-08-15
Payer: COMMERCIAL

## 2024-08-15 ENCOUNTER — LAB (OUTPATIENT)
Dept: LAB | Facility: HOSPITAL | Age: 39
End: 2024-08-15
Payer: COMMERCIAL

## 2024-08-15 DIAGNOSIS — F11.21 OPIOID USE DISORDER, SEVERE, IN SUSTAINED REMISSION: Primary | ICD-10-CM

## 2024-08-15 DIAGNOSIS — Z78.9 ALCOHOL USE: ICD-10-CM

## 2024-08-15 DIAGNOSIS — F15.21 METHAMPHETAMINE USE DISORDER, SEVERE, IN SUSTAINED REMISSION: ICD-10-CM

## 2024-08-15 DIAGNOSIS — F12.10 CANNABIS USE DISORDER, MILD, ABUSE: ICD-10-CM

## 2024-08-15 DIAGNOSIS — F11.21 OPIOID USE DISORDER, SEVERE, IN SUSTAINED REMISSION: ICD-10-CM

## 2024-08-15 PROCEDURE — 90853 GROUP PSYCHOTHERAPY: CPT | Performed by: COUNSELOR

## 2024-08-15 PROCEDURE — 80306 DRUG TEST PRSMV INSTRMNT: CPT

## 2024-08-15 NOTE — PROGRESS NOTES
"[facility] PHASE II / Phase III GROUP NOTE    Name: Gadiel Short  Date: August 15, 2024    Time in: 4:30   Time out:?5:30   Participants: 8     Data: 1?hour group therapy session 1?hour group therapy session (Check ins, what causes addiction activity videos, the science of addiction video, and positive affirmation)     Clinical Maneuvering/Interventions:?Therapist utilized a person-centered, Motivation interviewing, and CBT approaches to build rapport, exploring and resolving ambivalence associated with commitment to change behaviors related to substance use with and addiction, group member.?Therapist implemented motivational interviewing techniques to assist client with, facilitate identification of maladaptive patterns of thinking and behavior that contribute to client's risk for continued substance use and relapse.?Therapist employed group interaction activities to build rapport among group members, promote sobriety, and emphasize relapse prevention.?Therapist promoted a safe nonjudgmental environment by providing group members with unconditional positive regard and encouraging group members to comply with group rules and guidelines. Therapist assisted group member with identifying and implementing healthier coping strategies.      Response:?Patient attended class in person. Patient participated in completion of check in form. Patient on check in form answered no to question \"currently or since last group meeting,?have you had any suicidal thoughts or plan or intent to hurt yourself”, and patient also answered no to question of \"currently or since your last group meeting, have you had any homicidal thoughts or plan or intent to hurt others\". Patient openly shared thoughts about the video and what actions steps that can assist with improving skills in recovery and building connection.      On a 1:10 Scale (0-none, 10-high):    Anxiety: 0  Depression: 0  Cravings: 0    Assessment     Diagnoses and all orders for this " visit:    1. Opioid use disorder, severe, in sustained remission (Primary)    2. Methamphetamine use disorder, severe, in sustained remission             Progress toward goal: At goal    Functional Status: Moderate impairment     Prognosis: Good with Ongoing Treatment     Mental Status Exam:   Hygiene:   good  Cooperation:  Cooperative  Eye Contact:  Good  Psychomotor Behavior:  Appropriate  Affect:  Appropriate  Mood: normal  Speech:  Normal  Thought Process:  Linear  Thought Content:  Mood congruent  Suicidal:  None  Homicidal:  None  Hallucinations:  None  Delusion:  None  Memory:  Intact  Orientation:  Person, Place, Time, and Situation  Reliability:  fair  Insight:  Fair  Judgement:  Fair  Impulse Control:  Fair  Physical/Medical Issues:  No      Assessment:  Engaged in activity/Process and self-disclosed: Yes  Affect:Appropriate   Applies topic to self: Yes  Able to give and receive feedback: Yes    Urinalysis: Negative on prelim result dated 08/15/2024  Labs:   Last Urine Toxicity  More data exists         Latest Ref Rng & Units 8/15/2024 8/8/2024   LAST URINE TOXICITY RESULTS   Amphetamine, Urine Qual Negative Negative  Negative    Barbiturates Screen, Urine Negative Negative  Negative    Benzodiazepine Screen, Urine Negative Negative  Negative    Buprenorphine, Screen, Urine Negative Negative  Negative    Cocaine Screen, Urine Negative Negative  Negative    Methadone Screen , Urine Negative Negative  Negative    Methamphetamine, Ur Negative Negative  Negative       Details                    Self-Reported days since last use: patient check in sheet reports 120+ days    12-Step attendance: patient check-in sheet reports 1 self help group meeting    Plan:   Patient will continue in weekly group psychotherapy sessions and individual outpatient psychotherapy sessions every 3-4 weeks and will continue in self-help meetings and seek additional treatment if necessary following completion.    Patient will continue  in IOP Phase two and three group.      Safety plan has previously been discussed with patient.     CINTHYA Healy  [unfilled]  18:42 EDT

## 2024-08-16 LAB — REF LAB TEST METHOD: NORMAL

## 2024-08-19 ENCOUNTER — LAB (OUTPATIENT)
Dept: LAB | Facility: HOSPITAL | Age: 39
End: 2024-08-19
Payer: COMMERCIAL

## 2024-08-19 ENCOUNTER — OFFICE VISIT (OUTPATIENT)
Dept: PSYCHIATRY | Facility: CLINIC | Age: 39
End: 2024-08-19
Payer: COMMERCIAL

## 2024-08-19 DIAGNOSIS — F12.10 CANNABIS USE DISORDER, MILD, ABUSE: ICD-10-CM

## 2024-08-19 DIAGNOSIS — F15.21 METHAMPHETAMINE USE DISORDER, SEVERE, IN SUSTAINED REMISSION: ICD-10-CM

## 2024-08-19 DIAGNOSIS — F12.10 MILD CANNABIS USE DISORDER: ICD-10-CM

## 2024-08-19 DIAGNOSIS — Z78.9 ALCOHOL USE: ICD-10-CM

## 2024-08-19 DIAGNOSIS — F11.21 OPIOID USE DISORDER, SEVERE, IN SUSTAINED REMISSION: Primary | ICD-10-CM

## 2024-08-19 DIAGNOSIS — F11.21 OPIOID USE DISORDER, SEVERE, IN SUSTAINED REMISSION: ICD-10-CM

## 2024-08-19 PROCEDURE — 90853 GROUP PSYCHOTHERAPY: CPT | Performed by: COUNSELOR

## 2024-08-19 PROCEDURE — 80306 DRUG TEST PRSMV INSTRMNT: CPT

## 2024-08-20 LAB — REF LAB TEST METHOD: NORMAL

## 2024-08-21 NOTE — PROGRESS NOTES
"[facility] PHASE II / Phase III GROUP NOTE    Name: Gadiel Short  Date: August 19, 2024    Time in: 4:30   Time out:?5:30   Participants: 7     Data: 1?hour group therapy session 1?hour group therapy session (Check ins, team building exercise, and attitude in recovery discussion)     Clinical Maneuvering/Interventions:?Therapist utilized a person-centered, Motivation interviewing, and CBT approaches to build rapport, exploring and resolving ambivalence associated with commitment to change behaviors related to substance use with and addiction, group member.?Therapist implemented motivational interviewing techniques to assist client with, facilitate identification of maladaptive patterns of thinking and behavior that contribute to client's risk for continued substance use and relapse.?Therapist employed group interaction activities to build rapport among group members, promote sobriety, and emphasize relapse prevention.?Therapist promoted a safe nonjudgmental environment by providing group members with unconditional positive regard and encouraging group members to comply with group rules and guidelines. Therapist assisted group member with identifying and implementing healthier coping strategies.      Response:?Patient attended class in person. Patient participated in completion of check in form. Patient on check in form answered no to question \"currently or since last group meeting,?have you had any suicidal thoughts or plan or intent to hurt yourself”, and patient also answered no to question of \"currently or since your last group meeting, have you had any homicidal thoughts or plan or intent to hurt others\".  Patiently openly shared his thoughts on how attitude has affected him throughout his life and what he is doing to improve his attitude.    On a 1:10 Scale (0-none, 10-high):    Anxiety: 0  Depression: 0  Cravings: 0    Assessment     Diagnoses and all orders for this visit:    1. Opioid use disorder, severe, in " sustained remission (Primary)    2. Methamphetamine use disorder, severe, in sustained remission    3. Mild cannabis use disorder    4. Alcohol use             Progress toward goal: At goal    Functional Status: Moderate impairment     Prognosis: Good with Ongoing Treatment     Mental Status Exam:   Hygiene:   good  Cooperation:  Cooperative  Eye Contact:  Good  Psychomotor Behavior:  Appropriate  Affect:  Appropriate  Mood: normal  Speech:  Normal  Thought Process:  Linear  Thought Content:  Mood congruent  Suicidal:  None  Homicidal:  None  Hallucinations:  None  Delusion:  None  Memory:  Intact  Orientation:  Person, Place, Time, and Situation  Reliability:  fair  Insight:  Fair  Judgement:  Fair  Impulse Control:  Fair  Physical/Medical Issues:  No      Assessment:  Engaged in activity/Process and self-disclosed: Yes  Affect:Appropriate   Applies topic to self: Yes  Able to give and receive feedback: Yes    Urinalysis: Negative on preliminary result dated 8/19/2024  Labs:   Last Urine Toxicity  More data exists         Latest Ref Rng & Units 8/19/2024 8/15/2024   LAST URINE TOXICITY RESULTS   Amphetamine, Urine Qual Negative Negative  Negative    Barbiturates Screen, Urine Negative Negative  Negative    Benzodiazepine Screen, Urine Negative Negative  Negative    Buprenorphine, Screen, Urine Negative Negative  Negative    Cocaine Screen, Urine Negative Negative  Negative    Methadone Screen , Urine Negative Negative  Negative    Methamphetamine, Ur Negative Negative  Negative       Details                    Self-Reported days since last use: Patient's check-in she reports 120+ days    12-Step attendance: Patient's check-in she reports zero 12-step meetings    Plan:   Patient will continue in weekly group psychotherapy sessions and individual outpatient psychotherapy sessions every 3-4 weeks and will continue in self-help meetings and seek additional treatment if necessary following completion.    Patient will  continue in IOP Phase two and three group.      Safety plan has previously been discussed with patient.     CINTHYA Healy  [unfilled]  07:43 EDT

## 2024-08-22 ENCOUNTER — OFFICE VISIT (OUTPATIENT)
Dept: PSYCHIATRY | Facility: CLINIC | Age: 39
End: 2024-08-22
Payer: COMMERCIAL

## 2024-08-22 DIAGNOSIS — F11.21 OPIOID USE DISORDER, SEVERE, IN SUSTAINED REMISSION: Primary | ICD-10-CM

## 2024-08-22 DIAGNOSIS — Z78.9 ALCOHOL USE: ICD-10-CM

## 2024-08-22 DIAGNOSIS — F15.21 METHAMPHETAMINE USE DISORDER, SEVERE, IN SUSTAINED REMISSION: ICD-10-CM

## 2024-08-22 DIAGNOSIS — F12.10 MILD CANNABIS USE DISORDER: ICD-10-CM

## 2024-08-22 PROCEDURE — 90853 GROUP PSYCHOTHERAPY: CPT | Performed by: COUNSELOR

## 2024-08-23 NOTE — PROGRESS NOTES
"[facility] PHASE II / Phase III GROUP NOTE    Name: Gadiel Short  Date: August 22, 2024    Time in: 4:30   Time out:?5:30   Participants: 7     Data: 1?hour group therapy session 1?hour group therapy session (Check ins and humility in recover discussion)     Clinical Maneuvering/Interventions:?Therapist utilized a person-centered, Motivation interviewing, and CBT approaches to build rapport, exploring and resolving ambivalence associated with commitment to change behaviors related to substance use with and addiction, group member.?Therapist implemented motivational interviewing techniques to assist client with, facilitate identification of maladaptive patterns of thinking and behavior that contribute to client's risk for continued substance use and relapse.?Therapist employed group interaction activities to build rapport among group members, promote sobriety, and emphasize relapse prevention.?Therapist promoted a safe nonjudgmental environment by providing group members with unconditional positive regard and encouraging group members to comply with group rules and guidelines. Therapist assisted group member with identifying and implementing healthier coping strategies.      Response:?Patient attended class in person. Patient participated in completion of check in form. Patient on check in form answered no to question \"currently or since last group meeting,?have you had any suicidal thoughts or plan or intent to hurt yourself”, and patient also answered no to question of \"currently or since your last group meeting, have you had any homicidal thoughts or plan or intent to hurt others\".  Patient openly shared times that he had to be humbled and how practicing humility has helped him through those times and growing in his sobriety.    On a 1:10 Scale (0-none, 10-high):    Anxiety: 2  Depression: 0  Cravings: 0    Assessment     Diagnoses and all orders for this visit:    1. Opioid use disorder, severe, in sustained remission " (Primary)    2. Methamphetamine use disorder, severe, in sustained remission    3. Mild cannabis use disorder    4. Alcohol use             Progress toward goal: At goal    Functional Status: Moderate impairment     Prognosis: Good with Ongoing Treatment     Mental Status Exam:   Hygiene:   good  Cooperation:  Cooperative  Eye Contact:  Good  Psychomotor Behavior:  Appropriate  Affect:  Appropriate  Mood: normal  Speech:  Normal  Thought Process:  Linear  Thought Content:  Mood congruent  Suicidal:  None  Homicidal:  None  Hallucinations:  None  Delusion:  None  Memory:  Intact  Orientation:  Person, Place, Time, and Situation  Reliability:  fair  Insight:  Fair  Judgement:  Fair  Impulse Control:  Fair  Physical/Medical Issues:  No      Assessment:  Engaged in activity/Process and self-disclosed: Yes  Affect:Appropriate   Applies topic to self: Yes  Able to give and receive feedback: Yes    Urinalysis: Negative on preliminary result dated 8/19/2024   Labs:   Last Urine Toxicity  More data exists         Latest Ref Rng & Units 8/19/2024 8/15/2024   LAST URINE TOXICITY RESULTS   Amphetamine, Urine Qual Negative Negative  Negative    Barbiturates Screen, Urine Negative Negative  Negative    Benzodiazepine Screen, Urine Negative Negative  Negative    Buprenorphine, Screen, Urine Negative Negative  Negative    Cocaine Screen, Urine Negative Negative  Negative    Methadone Screen , Urine Negative Negative  Negative    Methamphetamine, Ur Negative Negative  Negative       Details                    Self-Reported days since last use: Patient's check-in sheet reports 120+ days    12-Step attendance: Patient's check-in sheet reports zero 12-step meetings    Plan:   Patient will continue in weekly group psychotherapy sessions and individual outpatient psychotherapy sessions every 3-4 weeks and will continue in self-help meetings and seek additional treatment if necessary following completion.    Patient will continue in IOP  Phase two and three group.      Safety plan has previously been discussed with patient.     CINTHYA Healy  [unfilled]  09:29 EDT

## 2024-08-26 ENCOUNTER — OFFICE VISIT (OUTPATIENT)
Dept: PSYCHIATRY | Facility: CLINIC | Age: 39
End: 2024-08-26
Payer: COMMERCIAL

## 2024-08-26 DIAGNOSIS — F12.10 MILD CANNABIS USE DISORDER: ICD-10-CM

## 2024-08-26 DIAGNOSIS — F15.21 METHAMPHETAMINE USE DISORDER, SEVERE, IN SUSTAINED REMISSION: ICD-10-CM

## 2024-08-26 DIAGNOSIS — F11.21 OPIOID USE DISORDER, SEVERE, IN SUSTAINED REMISSION: Primary | ICD-10-CM

## 2024-08-26 DIAGNOSIS — Z78.9 ALCOHOL USE: ICD-10-CM

## 2024-08-26 PROCEDURE — 90853 GROUP PSYCHOTHERAPY: CPT | Performed by: COUNSELOR

## 2024-08-27 LAB — REF LAB TEST METHOD: NORMAL

## 2024-08-27 NOTE — PROGRESS NOTES
"[facility] PHASE II / Phase III GROUP NOTE    Name: Gadiel Short  Date: August 26, 2024    Time in: 4:30   Time out:?5:30   Participants: 7     Data: 1?hour group therapy session 1?hour group therapy session (Check-ins, gratitude video, and gratitude exercise)     Clinical Maneuvering/Interventions:?Therapist utilized a person-centered, Motivation interviewing, and CBT approaches to build rapport, exploring and resolving ambivalence associated with commitment to change behaviors related to substance use with and addiction, group member.?Therapist implemented motivational interviewing techniques to assist client with, facilitate identification of maladaptive patterns of thinking and behavior that contribute to client's risk for continued substance use and relapse.?Therapist employed group interaction activities to build rapport among group members, promote sobriety, and emphasize relapse prevention.?Therapist promoted a safe nonjudgmental environment by providing group members with unconditional positive regard and encouraging group members to comply with group rules and guidelines. Therapist assisted group member with identifying and implementing healthier coping strategies.      Response:?Patient attended class in person. Patient participated in completion of check in form. Patient on check in form answered no to question \"currently or since last group meeting,?have you had any suicidal thoughts or plan or intent to hurt yourself”, and patient also answered no to question of \"currently or since your last group meeting, have you had any homicidal thoughts or plan or intent to hurt others\".    On a 1:10 Scale (0-none, 10-high):    Anxiety: 6  Depression: 8  Cravings: 0    Assessment     Diagnoses and all orders for this visit:    1. Opioid use disorder, severe, in sustained remission (Primary)    2. Methamphetamine use disorder, severe, in sustained remission    3. Mild cannabis use disorder    4. Alcohol use         "     Progress toward goal: At goal    Functional Status: Moderate impairment     Prognosis: Good with Ongoing Treatment     Mental Status Exam:   Hygiene:   good  Cooperation:  Cooperative  Eye Contact:  Good  Psychomotor Behavior:  Appropriate  Affect:  Appropriate  Mood: normal  Speech:  Normal  Thought Process:  Linear  Thought Content:  Mood congruent  Suicidal:  None  Homicidal:  None  Hallucinations:  None  Delusion:  None  Memory:  Intact  Orientation:  Person, Place, Time, and Situation  Reliability:  fair  Insight:  Fair  Judgement:  Fair  Impulse Control:  Fair  Physical/Medical Issues:  No      Assessment:  Engaged in activity/Process and self-disclosed: Yes  Affect:Appropriate   Applies topic to self: Yes  Able to give and receive feedback: Yes    Urinalysis: Negative on confirmation result dated 8/19/2024  Labs:   Last Urine Toxicity  More data exists         Latest Ref Rng & Units 8/19/2024 8/15/2024   LAST URINE TOXICITY RESULTS   Amphetamine, Urine Qual Negative Negative  Negative    Barbiturates Screen, Urine Negative Negative  Negative    Benzodiazepine Screen, Urine Negative Negative  Negative    Buprenorphine, Screen, Urine Negative Negative  Negative    Cocaine Screen, Urine Negative Negative  Negative    Methadone Screen , Urine Negative Negative  Negative    Methamphetamine, Ur Negative Negative  Negative       Details                    Self-Reported days since last use: Patient's check-in sheet reports 120+ days    12-Step attendance: Patient's check-in sheet reports zero 12-step meeting    Plan:   Patient will continue in weekly group psychotherapy sessions and individual outpatient psychotherapy sessions every 3-4 weeks and will continue in self-help meetings and seek additional treatment if necessary following completion.    Patient will continue in IOP Phase two and three group.      Safety plan has previously been discussed with patient.     CINTHYA Healy  [unfilled]  07:51 EDT

## 2024-08-29 ENCOUNTER — LAB (OUTPATIENT)
Dept: LAB | Facility: HOSPITAL | Age: 39
End: 2024-08-29
Payer: COMMERCIAL

## 2024-08-29 ENCOUNTER — OFFICE VISIT (OUTPATIENT)
Dept: PSYCHIATRY | Facility: CLINIC | Age: 39
End: 2024-08-29
Payer: COMMERCIAL

## 2024-08-29 DIAGNOSIS — F12.10 MILD CANNABIS USE DISORDER: ICD-10-CM

## 2024-08-29 DIAGNOSIS — F15.21 METHAMPHETAMINE USE DISORDER, SEVERE, IN SUSTAINED REMISSION: ICD-10-CM

## 2024-08-29 DIAGNOSIS — F11.21 OPIOID USE DISORDER, SEVERE, IN SUSTAINED REMISSION: ICD-10-CM

## 2024-08-29 DIAGNOSIS — F11.21 OPIOID USE DISORDER, SEVERE, IN SUSTAINED REMISSION: Primary | ICD-10-CM

## 2024-08-29 DIAGNOSIS — Z78.9 ALCOHOL USE: ICD-10-CM

## 2024-08-29 DIAGNOSIS — F12.10 CANNABIS USE DISORDER, MILD, ABUSE: ICD-10-CM

## 2024-08-29 PROCEDURE — 80306 DRUG TEST PRSMV INSTRMNT: CPT

## 2024-08-29 PROCEDURE — 90853 GROUP PSYCHOTHERAPY: CPT | Performed by: COUNSELOR

## 2024-08-29 NOTE — PROGRESS NOTES
"[facility] PHASE II / Phase III GROUP NOTE    Name: Gadiel Short  Date: August 29, 2024    Time in: 4:30   Time out:?5:30   Participants: 7     Data: 1?hour group therapy session 1?hour group therapy session (Check-ins and gratitude letter)     Clinical Maneuvering/Interventions:?Therapist utilized a person-centered, Motivation interviewing, and CBT approaches to build rapport, exploring and resolving ambivalence associated with commitment to change behaviors related to substance use with and addiction, group member.?Therapist implemented motivational interviewing techniques to assist client with, facilitate identification of maladaptive patterns of thinking and behavior that contribute to client's risk for continued substance use and relapse.?Therapist employed group interaction activities to build rapport among group members, promote sobriety, and emphasize relapse prevention.?Therapist promoted a safe nonjudgmental environment by providing group members with unconditional positive regard and encouraging group members to comply with group rules and guidelines. Therapist assisted group member with identifying and implementing healthier coping strategies.      Response:?Patient attended class in person. Patient participated in completion of check in form. Patient on check in form answered no to question \"currently or since last group meeting,?have you had any suicidal thoughts or plan or intent to hurt yourself”, and patient also answered no to question of \"currently or since your last group meeting, have you had any homicidal thoughts or plan or intent to hurt others\".  Patient encouraged to share gratitude letter and the benefits of gratitude to maintaining sobriety.    On a 1:10 Scale (0-none, 10-high):    Anxiety: 0  Depression: 0  Cravings: 0    Assessment     Diagnoses and all orders for this visit:    1. Opioid use disorder, severe, in sustained remission (Primary)    2. Methamphetamine use disorder, severe, in " sustained remission    3. Mild cannabis use disorder    4. Alcohol use             Progress toward goal: At goal    Functional Status: Moderate impairment     Prognosis: Good with Ongoing Treatment     Mental Status Exam:   Hygiene:   good  Cooperation:  Cooperative  Eye Contact:  Good  Psychomotor Behavior:  Appropriate  Affect:  Appropriate  Mood: normal  Speech:  Normal  Thought Process:  Goal directed and Linear  Thought Content:  Normal  Suicidal:  None  Homicidal:  None  Hallucinations:  None  Delusion:  None  Memory:  Intact  Orientation:  Person, Place, Time, and Situation  Reliability:  fair  Insight:  fair  Judgement:  fair  Impulse Control:  fair  Physical/Medical Issues:  No      Assessment:  Engaged in activity/Process and self-disclosed: Yes  Affect:Appropriate   Applies topic to self: Yes  Able to give and receive feedback: Yes    Urinalysis: Negative on confirmation from 8/19/2024.  Patient provided sample for UDS on 8/29/2024.  Lab has not released results yet.  Labs:   Last Urine Toxicity  More data exists         Latest Ref Rng & Units 8/19/2024 8/15/2024   LAST URINE TOXICITY RESULTS   Amphetamine, Urine Qual Negative Negative  Negative    Barbiturates Screen, Urine Negative Negative  Negative    Benzodiazepine Screen, Urine Negative Negative  Negative    Buprenorphine, Screen, Urine Negative Negative  Negative    Cocaine Screen, Urine Negative Negative  Negative    Methadone Screen , Urine Negative Negative  Negative    Methamphetamine, Ur Negative Negative  Negative       Details                    Self-Reported days since last use: Patient check-in sheet reports 120 plus    12-Step attendance: Patient check-in sheet reports zero 12 step group meetings almost there    Plan:   Patient will continue in weekly group psychotherapy sessions and individual outpatient psychotherapy sessions every 3-4 weeks and will continue in self-help meetings and seek additional treatment if necessary following  completion.    Patient will continue in IOP Phase two and three group.      Safety plan has previously been discussed with patient.     CINTHYA Healy  [unfilled]  18:33 EDT

## 2024-09-03 ENCOUNTER — OFFICE VISIT (OUTPATIENT)
Dept: PSYCHIATRY | Facility: CLINIC | Age: 39
End: 2024-09-03
Payer: COMMERCIAL

## 2024-09-03 DIAGNOSIS — F11.21 OPIOID USE DISORDER, SEVERE, IN SUSTAINED REMISSION: Primary | ICD-10-CM

## 2024-09-03 DIAGNOSIS — F12.10 MILD CANNABIS USE DISORDER: ICD-10-CM

## 2024-09-03 DIAGNOSIS — Z78.9 ALCOHOL USE: ICD-10-CM

## 2024-09-03 DIAGNOSIS — F15.21 METHAMPHETAMINE USE DISORDER, SEVERE, IN SUSTAINED REMISSION: ICD-10-CM

## 2024-09-03 PROCEDURE — 90853 GROUP PSYCHOTHERAPY: CPT | Performed by: COUNSELOR

## 2024-09-04 NOTE — PROGRESS NOTES
"[facility] PHASE II / Phase III GROUP NOTE    Name: Gadiel Short  Date: September 3, 2024    Time in: 4:30   Time out:?5:30   Participants: 3     Data: 1?hour group therapy session 1?hour group therapy session (Check-ins and value activities)     Clinical Maneuvering/Interventions:?Therapist utilized a person-centered, Motivation interviewing, and CBT approaches to build rapport, exploring and resolving ambivalence associated with commitment to change behaviors related to substance use with and addiction, group member.?Therapist implemented motivational interviewing techniques to assist client with, facilitate identification of maladaptive patterns of thinking and behavior that contribute to client's risk for continued substance use and relapse.?Therapist employed group interaction activities to build rapport among group members, promote sobriety, and emphasize relapse prevention.?Therapist promoted a safe nonjudgmental environment by providing group members with unconditional positive regard and encouraging group members to comply with group rules and guidelines. Therapist assisted group member with identifying and implementing healthier coping strategies.      Response:?Patient attended class in person. Patient participated in completion of check in form. Patient on check in form answered no to question \"currently or since last group meeting,?have you had any suicidal thoughts or plan or intent to hurt yourself”, and patient also answered no to question of \"currently or since your last group meeting, have you had any homicidal thoughts or plan or intent to hurt others\".  Patient openly shared some activities that he does to help him grow his values and his daily life.    On a 1:10 Scale (0-none, 10-high):    Anxiety: 4  Depression: 0  Cravings: 0    Assessment     Diagnoses and all orders for this visit:    1. Opioid use disorder, severe, in sustained remission (Primary)    2. Methamphetamine use disorder, severe, in " sustained remission    3. Mild cannabis use disorder    4. Alcohol use             Progress toward goal: At goal    Functional Status: Moderate impairment     Prognosis: Good with Ongoing Treatment     Mental Status Exam:   Hygiene:   good  Cooperation:  Cooperative  Eye Contact:  Good  Psychomotor Behavior:  Appropriate  Affect:  Appropriate  Mood: normal  Speech:  Normal  Thought Process:  Linear  Thought Content:  Mood congruent  Suicidal:  None  Homicidal:  None  Hallucinations:  None  Delusion:  None  Memory:  Intact  Orientation:  Person, Place, Time, and Situation  Reliability:  fair  Insight:  fair  Judgement:  fair  Impulse Control:  fair  Physical/Medical Issues:  No      Assessment:  Engaged in activity/Process and self-disclosed: Yes  Affect:Appropriate   Applies topic to self: Yes  Able to give and receive feedback: Yes    Urinalysis: Negative on preliminary results dated 8/29/2024  Labs:   Last Urine Toxicity  More data exists         Latest Ref Rng & Units 8/29/2024 8/19/2024   LAST URINE TOXICITY RESULTS   Amphetamine, Urine Qual Negative Negative  Negative    Barbiturates Screen, Urine Negative Negative  Negative    Benzodiazepine Screen, Urine Negative Negative  Negative    Buprenorphine, Screen, Urine Negative Negative  Negative    Cocaine Screen, Urine Negative Negative  Negative    Methadone Screen , Urine Negative Negative  Negative    Methamphetamine, Ur Negative Negative  Negative       Details                    Self-Reported days since last use: Patient check-in sheet reports 120+    12-Step attendance: Patient check-in sheet reports one 12-step meeting    Plan:   Patient will continue in weekly group psychotherapy sessions and individual outpatient psychotherapy sessions every 3-4 weeks and will continue in self-help meetings and seek additional treatment if necessary following completion.    Patient will continue in IOP Phase two and three group.      Safety plan has previously been  discussed with patient.     CINTHYA Healy  [unfilled]  07:57 EDT

## 2024-09-05 ENCOUNTER — OFFICE VISIT (OUTPATIENT)
Dept: PSYCHIATRY | Facility: CLINIC | Age: 39
End: 2024-09-05
Payer: COMMERCIAL

## 2024-09-05 ENCOUNTER — LAB (OUTPATIENT)
Dept: LAB | Facility: HOSPITAL | Age: 39
End: 2024-09-05
Payer: COMMERCIAL

## 2024-09-05 DIAGNOSIS — F11.21 OPIOID USE DISORDER, SEVERE, IN SUSTAINED REMISSION: ICD-10-CM

## 2024-09-05 DIAGNOSIS — F15.21 METHAMPHETAMINE USE DISORDER, SEVERE, IN SUSTAINED REMISSION: ICD-10-CM

## 2024-09-05 DIAGNOSIS — Z78.9 ALCOHOL USE: ICD-10-CM

## 2024-09-05 DIAGNOSIS — F12.10 CANNABIS USE DISORDER, MILD, ABUSE: ICD-10-CM

## 2024-09-05 DIAGNOSIS — F12.10 MILD CANNABIS USE DISORDER: ICD-10-CM

## 2024-09-05 DIAGNOSIS — F11.21 OPIOID USE DISORDER, SEVERE, IN SUSTAINED REMISSION: Primary | ICD-10-CM

## 2024-09-05 PROCEDURE — 80306 DRUG TEST PRSMV INSTRMNT: CPT

## 2024-09-05 PROCEDURE — 90853 GROUP PSYCHOTHERAPY: CPT | Performed by: COUNSELOR

## 2024-09-05 NOTE — PROGRESS NOTES
"[facility] PHASE II / Phase III GROUP NOTE    Name: Gadiel Short  Date: September 5, 2024    Time in: 4:30   Time out:?5:30   Participants: 8     Data: 1?hour group therapy session 1?hour group therapy session (Check-ins and negative core values activity)       Clinical Maneuvering/Interventions:?Therapist utilized a person-centered, Motivation interviewing, and CBT approaches to build rapport, exploring and resolving ambivalence associated with commitment to change behaviors related to substance use with and addiction, group member.?Therapist implemented motivational interviewing techniques to assist client with, facilitate identification of maladaptive patterns of thinking and behavior that contribute to client's risk for continued substance use and relapse.?Therapist employed group interaction activities to build rapport among group members, promote sobriety, and emphasize relapse prevention.?Therapist promoted a safe nonjudgmental environment by providing group members with unconditional positive regard and encouraging group members to comply with group rules and guidelines. Therapist assisted group member with identifying and implementing healthier coping strategies.      Response:?Patient attended class in person. Patient participated in completion of check in form. Patient on check in form answered no to question \"currently or since last group meeting,?have you had any suicidal thoughts or plan or intent to hurt yourself”, and patient also answered no to question of \"currently or since your last group meeting, have you had any homicidal thoughts or plan or intent to hurt others\".      On a 1:10 Scale (0-none, 10-high):    Anxiety: 0  Depression: 0  Cravings: 0    Assessment     Diagnoses and all orders for this visit:    1. Opioid use disorder, severe, in sustained remission (Primary)    2. Methamphetamine use disorder, severe, in sustained remission    3. Mild cannabis use disorder    4. Alcohol use         "     Progress toward goal: At goal    Functional Status: Moderate impairment     Prognosis: Good with Ongoing Treatment     Mental Status Exam:   Hygiene:   good  Cooperation:  Cooperative  Eye Contact:  Good  Psychomotor Behavior:  Appropriate  Affect:  Appropriate  Mood: normal  Speech:  Normal  Thought Process:  Goal directed  Thought Content:  Normal  Suicidal:  None  Homicidal:  None  Hallucinations:  None  Delusion:  None  Memory:  Intact  Orientation:  Person, Place, Time, and Situation  Reliability:  fair  Insight:  fair  Judgement:  fair  Impulse Control:  fair  Physical/Medical Issues:  No      Assessment:  Engaged in activity/Process and self-disclosed: Yes  Affect:Appropriate   Applies topic to self: Yes  Able to give and receive feedback: Yes    Urinalysis: Negative on preliminary result dated 9/5/2024.  Labs:   Last Urine Toxicity  More data exists         Latest Ref Rng & Units 9/5/2024 8/29/2024   LAST URINE TOXICITY RESULTS   Amphetamine, Urine Qual Negative Negative  Negative    Barbiturates Screen, Urine Negative Negative  Negative    Benzodiazepine Screen, Urine Negative Negative  Negative    Buprenorphine, Screen, Urine Negative Negative  Negative    Cocaine Screen, Urine Negative Negative  Negative    Methadone Screen , Urine Negative Negative  Negative    Methamphetamine, Ur Negative Negative  Negative       Details                    Self-Reported days since last use: Patient check-in sheet reports 120 plus    12-Step attendance: Patient check-in sheet reports zero 12-step meetings    Plan:   Patient will continue in weekly group psychotherapy sessions and individual outpatient psychotherapy sessions every 3-4 weeks and will continue in self-help meetings and seek additional treatment if necessary following completion.    Patient will continue in IOP Phase two and three group.      Safety plan has previously been discussed with patient.     CINTHYA Healy  [unfilled]  18:36 EDT

## 2024-09-09 ENCOUNTER — LAB (OUTPATIENT)
Dept: LAB | Facility: HOSPITAL | Age: 39
End: 2024-09-09
Payer: COMMERCIAL

## 2024-09-09 ENCOUNTER — OFFICE VISIT (OUTPATIENT)
Dept: PSYCHIATRY | Facility: CLINIC | Age: 39
End: 2024-09-09
Payer: COMMERCIAL

## 2024-09-09 DIAGNOSIS — F15.21 METHAMPHETAMINE USE DISORDER, SEVERE, IN SUSTAINED REMISSION: ICD-10-CM

## 2024-09-09 DIAGNOSIS — Z78.9 ALCOHOL USE: ICD-10-CM

## 2024-09-09 DIAGNOSIS — F12.10 CANNABIS USE DISORDER, MILD, ABUSE: ICD-10-CM

## 2024-09-09 DIAGNOSIS — F11.21 OPIOID USE DISORDER, SEVERE, IN SUSTAINED REMISSION: Primary | ICD-10-CM

## 2024-09-09 DIAGNOSIS — F12.10 MILD CANNABIS USE DISORDER: ICD-10-CM

## 2024-09-09 DIAGNOSIS — F11.21 OPIOID USE DISORDER, SEVERE, IN SUSTAINED REMISSION: ICD-10-CM

## 2024-09-09 PROCEDURE — 90853 GROUP PSYCHOTHERAPY: CPT | Performed by: COUNSELOR

## 2024-09-09 PROCEDURE — 80306 DRUG TEST PRSMV INSTRMNT: CPT

## 2024-09-10 LAB — REF LAB TEST METHOD: NORMAL

## 2024-09-10 NOTE — PROGRESS NOTES
"[facility] PHASE II / Phase III GROUP NOTE    Name: Gadiel Short  Date: September 9, 2024    Time in: 4:30   Time out:?5:30   Participants: 6     Data: 1?hour group therapy session 1?hour group therapy session (Check-ins, negative core values activity, and jeopardy)       Clinical Maneuvering/Interventions:?Therapist utilized a person-centered, Motivation interviewing, and CBT approaches to build rapport, exploring and resolving ambivalence associated with commitment to change behaviors related to substance use with and addiction, group member.?Therapist implemented motivational interviewing techniques to assist client with, facilitate identification of maladaptive patterns of thinking and behavior that contribute to client's risk for continued substance use and relapse.?Therapist employed group interaction activities to build rapport among group members, promote sobriety, and emphasize relapse prevention.?Therapist promoted a safe nonjudgmental environment by providing group members with unconditional positive regard and encouraging group members to comply with group rules and guidelines. Therapist assisted group member with identifying and implementing healthier coping strategies.      Response:?Patient attended class in person. Patient participated in completion of check in form. Patient on check in form answered no to question \"currently or since last group meeting,?have you had any suicidal thoughts or plan or intent to hurt yourself”, and patient also answered no to question of \"currently or since your last group meeting, have you had any homicidal thoughts or plan or intent to hurt others\".  Patient openly shared some negative core values that he struggles with and what he is doing to reframe those values.  Patient participated in a Jv activity to demonstrate his knowledge on the material, built-in building skills, and communication skills.    On a 1:10 Scale (0-none, 10-high):    Anxiety: 4  Depression: " 1  Cravings: 0    Assessment     Diagnoses and all orders for this visit:    1. Opioid use disorder, severe, in sustained remission (Primary)    2. Methamphetamine use disorder, severe, in sustained remission    3. Mild cannabis use disorder    4. Alcohol use             Progress toward goal: At goal    Functional Status: Moderate impairment     Prognosis: Good with Ongoing Treatment     Mental Status Exam:   Hygiene:   good  Cooperation:  Cooperative  Eye Contact:  Good  Psychomotor Behavior:  Appropriate  Affect:  Appropriate  Mood: normal  Speech:  Normal  Thought Process:  Linear  Thought Content:  Mood congruent  Suicidal:  None  Homicidal:  None  Hallucinations:  None  Delusion:  None  Memory:  Intact  Orientation:  Person, Place, Time, and Situation  Reliability:  fair  Insight:  fair  Judgement:  fair  Impulse Control:  fair  Physical/Medical Issues:  No      Assessment:  Engaged in activity/Process and self-disclosed: Yes  Affect:Appropriate   Applies topic to self: Yes  Able to give and receive feedback: Yes    Urinalysis: Negative on preliminary result dated 9/9/2024  Labs:   Last Urine Toxicity  More data exists         Latest Ref Rng & Units 9/9/2024 9/5/2024   LAST URINE TOXICITY RESULTS   Amphetamine, Urine Qual Negative Negative  Negative    Barbiturates Screen, Urine Negative Negative  Negative    Benzodiazepine Screen, Urine Negative Negative  Negative    Buprenorphine, Screen, Urine Negative Negative  Negative    Cocaine Screen, Urine Negative Negative  Negative    Methadone Screen , Urine Negative Negative  Negative    Methamphetamine, Ur Negative Negative  Negative       Details                    Self-Reported days since last use: Patient check-in sheet reports 120+ day    12-Step attendance: Patient check-in sheet reports 0 self-help meetings    Plan:   Patient will continue in weekly group psychotherapy sessions and individual outpatient psychotherapy sessions every 3-4 weeks and will  continue in self-help meetings and seek additional treatment if necessary following completion.    Patient will continue in IOP Phase two and three group.      Safety plan has previously been discussed with patient.     CINTHYA Healy  [unfilled]  09:09 EDT

## 2024-09-12 ENCOUNTER — OFFICE VISIT (OUTPATIENT)
Dept: PSYCHIATRY | Facility: CLINIC | Age: 39
End: 2024-09-12
Payer: COMMERCIAL

## 2024-09-12 DIAGNOSIS — F11.21 OPIOID USE DISORDER, SEVERE, IN SUSTAINED REMISSION: Primary | ICD-10-CM

## 2024-09-12 DIAGNOSIS — F12.10 MILD CANNABIS USE DISORDER: ICD-10-CM

## 2024-09-12 DIAGNOSIS — F15.21 METHAMPHETAMINE USE DISORDER, SEVERE, IN SUSTAINED REMISSION: ICD-10-CM

## 2024-09-12 PROCEDURE — 90853 GROUP PSYCHOTHERAPY: CPT | Performed by: SOCIAL WORKER

## 2024-09-13 LAB — REF LAB TEST METHOD: NORMAL

## 2024-09-16 ENCOUNTER — OFFICE VISIT (OUTPATIENT)
Dept: PSYCHIATRY | Facility: CLINIC | Age: 39
End: 2024-09-16
Payer: COMMERCIAL

## 2024-09-16 DIAGNOSIS — F12.10 MILD CANNABIS USE DISORDER: ICD-10-CM

## 2024-09-16 DIAGNOSIS — Z78.9 ALCOHOL USE: ICD-10-CM

## 2024-09-16 DIAGNOSIS — F11.21 OPIOID USE DISORDER, SEVERE, IN SUSTAINED REMISSION: Primary | ICD-10-CM

## 2024-09-16 DIAGNOSIS — F15.21 METHAMPHETAMINE USE DISORDER, SEVERE, IN SUSTAINED REMISSION: ICD-10-CM

## 2024-09-16 PROCEDURE — 90853 GROUP PSYCHOTHERAPY: CPT | Performed by: COUNSELOR

## 2024-09-19 ENCOUNTER — LAB (OUTPATIENT)
Dept: LAB | Facility: HOSPITAL | Age: 39
End: 2024-09-19
Payer: COMMERCIAL

## 2024-09-19 ENCOUNTER — OFFICE VISIT (OUTPATIENT)
Dept: PSYCHIATRY | Facility: CLINIC | Age: 39
End: 2024-09-19
Payer: COMMERCIAL

## 2024-09-19 DIAGNOSIS — F15.21 METHAMPHETAMINE USE DISORDER, SEVERE, IN SUSTAINED REMISSION: ICD-10-CM

## 2024-09-19 DIAGNOSIS — F12.10 MILD CANNABIS USE DISORDER: ICD-10-CM

## 2024-09-19 DIAGNOSIS — F11.21 OPIOID USE DISORDER, SEVERE, IN SUSTAINED REMISSION: Primary | ICD-10-CM

## 2024-09-19 DIAGNOSIS — Z78.9 ALCOHOL USE: ICD-10-CM

## 2024-09-19 DIAGNOSIS — F12.10 CANNABIS USE DISORDER, MILD, ABUSE: ICD-10-CM

## 2024-09-19 DIAGNOSIS — F11.21 OPIOID USE DISORDER, SEVERE, IN SUSTAINED REMISSION: ICD-10-CM

## 2024-09-19 PROCEDURE — 80306 DRUG TEST PRSMV INSTRMNT: CPT

## 2024-09-19 PROCEDURE — 90853 GROUP PSYCHOTHERAPY: CPT | Performed by: COUNSELOR

## 2024-09-23 ENCOUNTER — LAB (OUTPATIENT)
Dept: LAB | Facility: HOSPITAL | Age: 39
End: 2024-09-23
Payer: COMMERCIAL

## 2024-09-23 ENCOUNTER — OFFICE VISIT (OUTPATIENT)
Dept: PSYCHIATRY | Facility: CLINIC | Age: 39
End: 2024-09-23
Payer: COMMERCIAL

## 2024-09-23 DIAGNOSIS — F11.21 OPIOID USE DISORDER, SEVERE, IN SUSTAINED REMISSION: Primary | ICD-10-CM

## 2024-09-23 DIAGNOSIS — F12.10 CANNABIS USE DISORDER, MILD, ABUSE: ICD-10-CM

## 2024-09-23 DIAGNOSIS — F12.10 MILD CANNABIS USE DISORDER: ICD-10-CM

## 2024-09-23 DIAGNOSIS — F11.21 OPIOID USE DISORDER, SEVERE, IN SUSTAINED REMISSION: ICD-10-CM

## 2024-09-23 DIAGNOSIS — F15.21 METHAMPHETAMINE USE DISORDER, SEVERE, IN SUSTAINED REMISSION: ICD-10-CM

## 2024-09-23 DIAGNOSIS — Z78.9 ALCOHOL USE: ICD-10-CM

## 2024-09-23 PROCEDURE — 90853 GROUP PSYCHOTHERAPY: CPT | Performed by: COUNSELOR

## 2024-09-23 PROCEDURE — 80306 DRUG TEST PRSMV INSTRMNT: CPT

## 2024-09-24 LAB — REF LAB TEST METHOD: NORMAL

## 2024-09-26 ENCOUNTER — OFFICE VISIT (OUTPATIENT)
Dept: PSYCHIATRY | Facility: CLINIC | Age: 39
End: 2024-09-26
Payer: COMMERCIAL

## 2024-09-26 DIAGNOSIS — Z78.9 ALCOHOL USE: ICD-10-CM

## 2024-09-26 DIAGNOSIS — F15.21 METHAMPHETAMINE USE DISORDER, SEVERE, IN SUSTAINED REMISSION: ICD-10-CM

## 2024-09-26 DIAGNOSIS — F12.10 MILD CANNABIS USE DISORDER: ICD-10-CM

## 2024-09-26 DIAGNOSIS — F11.21 OPIOID USE DISORDER, SEVERE, IN SUSTAINED REMISSION: Primary | ICD-10-CM

## 2024-09-26 PROCEDURE — 90853 GROUP PSYCHOTHERAPY: CPT | Performed by: COUNSELOR

## 2024-09-27 LAB — REF LAB TEST METHOD: NORMAL

## 2024-10-03 ENCOUNTER — LAB (OUTPATIENT)
Dept: LAB | Facility: HOSPITAL | Age: 39
End: 2024-10-03
Payer: COMMERCIAL

## 2024-10-03 DIAGNOSIS — F11.21 OPIOID USE DISORDER, SEVERE, IN SUSTAINED REMISSION: ICD-10-CM

## 2024-10-03 DIAGNOSIS — F15.21 METHAMPHETAMINE USE DISORDER, SEVERE, IN SUSTAINED REMISSION: ICD-10-CM

## 2024-10-03 DIAGNOSIS — F12.10 CANNABIS USE DISORDER, MILD, ABUSE: ICD-10-CM

## 2024-10-03 DIAGNOSIS — Z78.9 ALCOHOL USE: ICD-10-CM

## 2024-10-03 PROCEDURE — 80306 DRUG TEST PRSMV INSTRMNT: CPT

## 2024-10-07 ENCOUNTER — LAB (OUTPATIENT)
Dept: LAB | Facility: HOSPITAL | Age: 39
End: 2024-10-07
Payer: COMMERCIAL

## 2024-10-07 DIAGNOSIS — F15.21 METHAMPHETAMINE USE DISORDER, SEVERE, IN SUSTAINED REMISSION: ICD-10-CM

## 2024-10-07 DIAGNOSIS — F11.21 OPIOID USE DISORDER, SEVERE, IN SUSTAINED REMISSION: ICD-10-CM

## 2024-10-07 DIAGNOSIS — Z78.9 ALCOHOL USE: ICD-10-CM

## 2024-10-07 DIAGNOSIS — F12.10 CANNABIS USE DISORDER, MILD, ABUSE: ICD-10-CM

## 2024-10-07 PROCEDURE — 80306 DRUG TEST PRSMV INSTRMNT: CPT

## 2024-10-08 LAB — REF LAB TEST METHOD: NORMAL

## 2024-10-10 ENCOUNTER — OFFICE VISIT (OUTPATIENT)
Dept: PSYCHIATRY | Facility: CLINIC | Age: 39
End: 2024-10-10
Payer: COMMERCIAL

## 2024-10-10 DIAGNOSIS — F15.21 METHAMPHETAMINE USE DISORDER, SEVERE, IN SUSTAINED REMISSION: ICD-10-CM

## 2024-10-10 DIAGNOSIS — F12.10 MILD CANNABIS USE DISORDER: ICD-10-CM

## 2024-10-10 DIAGNOSIS — F11.21 OPIOID USE DISORDER, SEVERE, IN SUSTAINED REMISSION: Primary | ICD-10-CM

## 2024-10-10 PROCEDURE — 90853 GROUP PSYCHOTHERAPY: CPT | Performed by: COUNSELOR

## 2024-10-10 NOTE — PROGRESS NOTES
"[facility] PHASE II / Phase III GROUP NOTE    Name: Gadiel Short  Date: October 10, 2024    Time in: 4:30   Time out:?5:30   Participants: 10     Data: 1?hour group therapy session 1?hour group therapy session (Check ins, 12 step information and team building activity)       Clinical Maneuvering/Interventions:?Therapist utilized a person-centered, Motivation interviewing, and CBT approaches to build rapport, exploring and resolving ambivalence associated with commitment to change behaviors related to substance use with and addiction, group member.?Therapist implemented motivational interviewing techniques to assist client with, facilitate identification of maladaptive patterns of thinking and behavior that contribute to client's risk for continued substance use and relapse.?Therapist employed group interaction activities to build rapport among group members, promote sobriety, and emphasize relapse prevention.?Therapist promoted a safe nonjudgmental environment by providing group members with unconditional positive regard and encouraging group members to comply with group rules and guidelines. Therapist assisted group member with identifying and implementing healthier coping strategies.      Response:?Patient attended class in person. Patient participated in completion of check in form. Patient on check in form answered no to question \"currently or since last group meeting,?have you had any suicidal thoughts or plan or intent to hurt yourself”, and patient also answered no to question of \"currently or since your last group meeting, have you had any homicidal thoughts or plan or intent to hurt others\".  Patient encouraged to participate in discussion regarding changing morals and values over time.    On a 1:10 Scale (0-none, 10-high):    Anxiety: 2  Depression: 0  Cravings: 0    Assessment     Diagnoses and all orders for this visit:    1. Opioid use disorder, severe, in sustained remission (Primary)    2. Methamphetamine use " disorder, severe, in sustained remission    3. Mild cannabis use disorder             Progress toward goal: At goal    Functional Status: Moderate impairment     Prognosis: Good with Ongoing Treatment     Mental Status Exam:   Hygiene:   good  Cooperation:  Cooperative  Eye Contact:  Good  Psychomotor Behavior:  Appropriate  Affect:  Appropriate  Mood: normal  Speech:  Normal  Thought Process:  Linear  Thought Content:  Mood congruent  Suicidal:  None  Homicidal:  None  Hallucinations:  None  Delusion:  None  Memory:  Intact  Orientation:  Person, Place, Time, and Situation  Reliability:  fair  Insight:  fair  Judgement:  fair  Impulse Control:  fair  Physical/Medical Issues:  No      Assessment:  Engaged in activity/Process and self-disclosed: Yes  Affect:Appropriate   Applies topic to self: Yes  Able to give and receive feedback: Yes    Urinalysis: Negative  on preliminary result dated 10/10/2024  Labs:   Last Urine Toxicity  More data exists         Latest Ref Rng & Units 10/7/2024 10/3/2024   LAST URINE TOXICITY RESULTS   Amphetamine, Urine Qual Negative Negative  Negative    Barbiturates Screen, Urine Negative Negative  Negative    Benzodiazepine Screen, Urine Negative Negative  Negative    Buprenorphine, Screen, Urine Negative Negative  Negative    Cocaine Screen, Urine Negative Negative  Negative    Methadone Screen , Urine Negative Negative  Negative    Methamphetamine, Ur Negative Negative  Negative       Details                    Self-Reported days since last use: Patient check-in sheet reports 150+    12-Step attendance: Patient check-in sheet reports zero 12-step meetings    Plan:   Patient will continue in weekly group psychotherapy sessions and individual outpatient psychotherapy sessions every 3-4 weeks and will continue in self-help meetings and seek additional treatment if necessary following completion.    Patient will continue in IOP Phase two and three group.      Safety plan has previously been  discussed with patient.     CINTHYA Healy  [unfilled]  16:51 EDT

## 2024-10-14 LAB — REF LAB TEST METHOD: NORMAL

## 2024-10-17 ENCOUNTER — LAB (OUTPATIENT)
Dept: LAB | Facility: HOSPITAL | Age: 39
End: 2024-10-17
Payer: COMMERCIAL

## 2024-10-17 ENCOUNTER — OFFICE VISIT (OUTPATIENT)
Dept: PSYCHIATRY | Facility: CLINIC | Age: 39
End: 2024-10-17
Payer: COMMERCIAL

## 2024-10-17 DIAGNOSIS — F11.21 OPIOID USE DISORDER, SEVERE, IN SUSTAINED REMISSION: Primary | ICD-10-CM

## 2024-10-17 DIAGNOSIS — Z78.9 ALCOHOL USE: ICD-10-CM

## 2024-10-17 DIAGNOSIS — F12.10 MILD CANNABIS USE DISORDER: ICD-10-CM

## 2024-10-17 DIAGNOSIS — F11.21 OPIOID USE DISORDER, SEVERE, IN SUSTAINED REMISSION: ICD-10-CM

## 2024-10-17 DIAGNOSIS — F15.21 METHAMPHETAMINE USE DISORDER, SEVERE, IN SUSTAINED REMISSION: ICD-10-CM

## 2024-10-17 DIAGNOSIS — F12.10 CANNABIS USE DISORDER, MILD, ABUSE: ICD-10-CM

## 2024-10-17 PROCEDURE — 90853 GROUP PSYCHOTHERAPY: CPT | Performed by: COUNSELOR

## 2024-10-17 PROCEDURE — 80306 DRUG TEST PRSMV INSTRMNT: CPT

## 2024-10-17 NOTE — PROGRESS NOTES
"[facility] PHASE II / Phase III GROUP NOTE    Name: Gadiel Short  Date: October 17, 2024    Time in: 4:30   Time out:?5:30   Participants: 9     Data: 1?hour group therapy session 1?hour group therapy session (Check ins, guilt and shame activity and team building activity)       Clinical Maneuvering/Interventions:?Therapist utilized a person-centered, Motivation interviewing, and CBT approaches to build rapport, exploring and resolving ambivalence associated with commitment to change behaviors related to substance use with and addiction, group member.?Therapist implemented motivational interviewing techniques to assist client with, facilitate identification of maladaptive patterns of thinking and behavior that contribute to client's risk for continued substance use and relapse.?Therapist employed group interaction activities to build rapport among group members, promote sobriety, and emphasize relapse prevention.?Therapist promoted a safe nonjudgmental environment by providing group members with unconditional positive regard and encouraging group members to comply with group rules and guidelines. Therapist assisted group member with identifying and implementing healthier coping strategies.      Response:?Patient attended class in person. Patient participated in completion of check in form. Patient on check in form answered no to question \"currently or since last group meeting,?have you had any suicidal thoughts or plan or intent to hurt yourself”, and patient also answered no to question of \"currently or since your last group meeting, have you had any homicidal thoughts or plan or intent to hurt others\".  Patient participated in discussion regarding guilt and shame and how it affected them personally.    On a 1:10 Scale (0-none, 10-high):    Anxiety: 0  Depression: 0  Cravings: 0    Assessment     Diagnoses and all orders for this visit:    1. Opioid use disorder, severe, in sustained remission (Primary)    2. " Methamphetamine use disorder, severe, in sustained remission    3. Mild cannabis use disorder    4. Alcohol use             Progress toward goal: At goal    Functional Status: Moderate impairment     Prognosis: Good with Ongoing Treatment     Mental Status Exam:   Hygiene:   good  Cooperation:  Cooperative  Eye Contact:  Good  Psychomotor Behavior:  Appropriate  Affect:  Appropriate  Mood: normal  Speech:  Normal  Thought Process:  Linear  Thought Content:  Mood congruent  Suicidal:  None  Homicidal:  None  Hallucinations:  None  Delusion:  None  Memory:  Intact  Orientation:  Person, Place, Time, and Situation  Reliability:  fair  Insight:  fair  Judgement:  fair  Impulse Control:  fair  Physical/Medical Issues:  No      Assessment:  Engaged in activity/Process and self-disclosed: Yes  Affect:Appropriate   Applies topic to self: Yes  Able to give and receive feedback: Yes    Urinalysis: Negative on preliminary results dated 10/17/2024.  Labs:   Last Urine Toxicity  More data exists         Latest Ref Rng & Units 10/7/2024 10/3/2024   LAST URINE TOXICITY RESULTS   Amphetamine, Urine Qual Negative Negative  Negative    Barbiturates Screen, Urine Negative Negative  Negative    Benzodiazepine Screen, Urine Negative Negative  Negative    Buprenorphine, Screen, Urine Negative Negative  Negative    Cocaine Screen, Urine Negative Negative  Negative    Methadone Screen , Urine Negative Negative  Negative    Methamphetamine, Ur Negative Negative  Negative       Details                    Self-Reported days since last use: Patient check-in sheet reports 170+    12-Step attendance: Patient check-in sheet reports zero 12-step meetings    Plan:   Patient will continue in weekly group psychotherapy sessions and individual outpatient psychotherapy sessions every 3-4 weeks and will continue in self-help meetings and seek additional treatment if necessary following completion.    Patient will continue in IOP Phase two and three  group.      Safety plan has previously been discussed with patient.     CINTHYA Healy  [unfilled]  16:58 EDT

## 2024-10-22 LAB — REF LAB TEST METHOD: NORMAL

## 2024-10-24 ENCOUNTER — OFFICE VISIT (OUTPATIENT)
Dept: PSYCHIATRY | Facility: CLINIC | Age: 39
End: 2024-10-24
Payer: COMMERCIAL

## 2024-10-24 ENCOUNTER — LAB (OUTPATIENT)
Dept: LAB | Facility: HOSPITAL | Age: 39
End: 2024-10-24
Payer: COMMERCIAL

## 2024-10-24 DIAGNOSIS — F15.21 METHAMPHETAMINE USE DISORDER, SEVERE, IN SUSTAINED REMISSION: ICD-10-CM

## 2024-10-24 DIAGNOSIS — Z78.9 ALCOHOL USE: ICD-10-CM

## 2024-10-24 DIAGNOSIS — F11.21 OPIOID USE DISORDER, SEVERE, IN SUSTAINED REMISSION: Primary | ICD-10-CM

## 2024-10-24 DIAGNOSIS — F12.10 MILD CANNABIS USE DISORDER: ICD-10-CM

## 2024-10-24 DIAGNOSIS — F11.21 OPIOID USE DISORDER, SEVERE, IN SUSTAINED REMISSION: ICD-10-CM

## 2024-10-24 DIAGNOSIS — F12.10 CANNABIS USE DISORDER, MILD, ABUSE: ICD-10-CM

## 2024-10-24 PROCEDURE — 90853 GROUP PSYCHOTHERAPY: CPT | Performed by: COUNSELOR

## 2024-10-24 PROCEDURE — 80306 DRUG TEST PRSMV INSTRMNT: CPT

## 2024-10-24 NOTE — PROGRESS NOTES
"[facility] PHASE II / Phase III GROUP NOTE    Name: Gadiel Short  Date: October 24, 2024    Time in: 4:30   Time out:?5:30   Participants: 11     Data: 1?hour group therapy session 1?hour group therapy session (Check ins, fair fighting rules, discussion on cravings, and relapse prevention plan)         Clinical Maneuvering/Interventions:?Therapist utilized a person-centered, Motivation interviewing, and CBT approaches to build rapport, exploring and resolving ambivalence associated with commitment to change behaviors related to substance use with and addiction, group member.?Therapist implemented motivational interviewing techniques to assist client with, facilitate identification of maladaptive patterns of thinking and behavior that contribute to client's risk for continued substance use and relapse.?Therapist employed group interaction activities to build rapport among group members, promote sobriety, and emphasize relapse prevention.?Therapist promoted a safe nonjudgmental environment by providing group members with unconditional positive regard and encouraging group members to comply with group rules and guidelines. Therapist assisted group member with identifying and implementing healthier coping strategies.      Response:?Patient attended class in person. Patient participated in completion of check in form. Patient on check in form answered no to question \"currently or since last group meeting,?have you had any suicidal thoughts or plan or intent to hurt yourself”, and patient also answered no to question of \"currently or since your last group meeting, have you had any homicidal thoughts or plan or intent to hurt others\".  Patient participated in discussion regarding fair fighting rules, triggers and coping with unmet needs.    On a 1:10 Scale (0-none, 10-high):    Anxiety: 0  Depression: 0  Cravings: 0    Assessment     Diagnoses and all orders for this visit:    1. Opioid use disorder, severe, in sustained remission " (Primary)    2. Methamphetamine use disorder, severe, in sustained remission    3. Mild cannabis use disorder    4. Alcohol use             Progress toward goal: At goal    Functional Status: Moderate impairment     Prognosis: Good with Ongoing Treatment     Mental Status Exam:   Hygiene:   good  Cooperation:  Cooperative  Eye Contact:  Good  Psychomotor Behavior:  Appropriate  Affect:  Appropriate  Mood: normal  Speech:  Normal  Thought Process:  Linear  Thought Content:  Normal  Suicidal:  None  Homicidal:  None  Hallucinations:  None  Delusion:  None  Memory:  Intact  Orientation:  Person, Place, Time, and Situation  Reliability:  fair  Insight:  fair  Judgement:  fair  Impulse Control:  fair  Physical/Medical Issues:  No      Assessment:  Engaged in activity/Process and self-disclosed: Yes  Affect:Appropriate   Applies topic to self: Yes  Able to give and receive feedback: Yes    Urinalysis: Negative on confirmation results dated 10/17/2024.  Labs:   Last Urine Toxicity  More data exists         Latest Ref Rng & Units 10/17/2024 10/7/2024   LAST URINE TOXICITY RESULTS   Amphetamine, Urine Qual Negative Negative  Negative    Barbiturates Screen, Urine Negative Negative  Negative    Benzodiazepine Screen, Urine Negative Negative  Negative    Buprenorphine, Screen, Urine Negative Negative  Negative    Cocaine Screen, Urine Negative Negative  Negative    Methadone Screen , Urine Negative Negative  Negative    Methamphetamine, Ur Negative Negative  Negative       Details                    Self-Reported days since last use: Patient check-in sheet reports 180    12-Step attendance: Patient check-in sheet reports one 12-step meeting    Plan:   Patient will continue in weekly group psychotherapy sessions and individual outpatient psychotherapy sessions every 3-4 weeks and will continue in self-help meetings and seek additional treatment if necessary following completion.    Patient will continue in IOP Phase two and  three group.      Safety plan has previously been discussed with patient.     CINTHYA Healy  [unfilled]  16:59 EDT

## 2024-10-28 LAB — REF LAB TEST METHOD: NORMAL

## 2024-10-31 ENCOUNTER — LAB (OUTPATIENT)
Dept: LAB | Facility: HOSPITAL | Age: 39
End: 2024-10-31
Payer: COMMERCIAL

## 2024-10-31 ENCOUNTER — OFFICE VISIT (OUTPATIENT)
Dept: PSYCHIATRY | Facility: CLINIC | Age: 39
End: 2024-10-31
Payer: COMMERCIAL

## 2024-10-31 DIAGNOSIS — F11.21 OPIOID USE DISORDER, SEVERE, IN SUSTAINED REMISSION: Primary | ICD-10-CM

## 2024-10-31 DIAGNOSIS — Z78.9 ALCOHOL USE: ICD-10-CM

## 2024-10-31 DIAGNOSIS — F12.10 MILD CANNABIS USE DISORDER: ICD-10-CM

## 2024-10-31 DIAGNOSIS — F15.21 METHAMPHETAMINE USE DISORDER, SEVERE, IN SUSTAINED REMISSION: ICD-10-CM

## 2024-10-31 DIAGNOSIS — F11.21 OPIOID USE DISORDER, SEVERE, IN SUSTAINED REMISSION: ICD-10-CM

## 2024-10-31 DIAGNOSIS — F12.10 CANNABIS USE DISORDER, MILD, ABUSE: ICD-10-CM

## 2024-10-31 PROCEDURE — 80306 DRUG TEST PRSMV INSTRMNT: CPT

## 2024-10-31 PROCEDURE — 90853 GROUP PSYCHOTHERAPY: CPT | Performed by: COUNSELOR

## 2024-11-01 NOTE — PROGRESS NOTES
"[facility] PHASE II / Phase III GROUP NOTE    Name: Gadiel Short  Date: October 31, 2024    Time in: 4:30   Time out:?5:30   Participants: 11     Data: 1?hour group therapy session 1?hour group therapy session (Check ins, discussion on cravings, and relapse prevention plan)        Clinical Maneuvering/Interventions:?Therapist utilized a person-centered, Motivation interviewing, and CBT approaches to build rapport, exploring and resolving ambivalence associated with commitment to change behaviors related to substance use with and addiction, group member.?Therapist implemented motivational interviewing techniques to assist client with, facilitate identification of maladaptive patterns of thinking and behavior that contribute to client's risk for continued substance use and relapse.?Therapist employed group interaction activities to build rapport among group members, promote sobriety, and emphasize relapse prevention.?Therapist promoted a safe nonjudgmental environment by providing group members with unconditional positive regard and encouraging group members to comply with group rules and guidelines. Therapist assisted group member with identifying and implementing healthier coping strategies.      Response:?Patient attended class in person. Patient participated in completion of check in form. Patient on check in form answered no to question \"currently or since last group meeting,?have you had any suicidal thoughts or plan or intent to hurt yourself”, and patient also answered no to question of \"currently or since your last group meeting, have you had any homicidal thoughts or plan or intent to hurt others\".  Patient participated fully in group discussion by identifying what cravings are like for him, what action steps he is taking his relapse prevention plan, and collaborated with other group members to demonstrate knowledge on the material during an activity.  Patient provided positive feedback to other members and words of " encouragement.    On a 1:10 Scale (0-none, 10-high):    Anxiety: 4  Depression: 0  Cravings: 0    Assessment     Diagnoses and all orders for this visit:    1. Opioid use disorder, severe, in sustained remission (Primary)    2. Methamphetamine use disorder, severe, in sustained remission    3. Mild cannabis use disorder    4. Alcohol use             Progress toward goal: At goal    Functional Status: Moderate impairment     Prognosis: Good with Ongoing Treatment     Mental Status Exam:   Hygiene:   good  Cooperation:  Cooperative  Eye Contact:  Good  Psychomotor Behavior:  Appropriate  Affect:  Appropriate  Mood: anxious  Speech:  Normal  Thought Process:  Linear  Thought Content:  Mood congruent  Suicidal:  None  Homicidal:  None  Hallucinations:  None  Delusion:  None  Memory:  Intact  Orientation:  Person, Place, Time, and Situation  Reliability:  fair  Insight:  fair  Judgement:  fair  Impulse Control:  fair  Physical/Medical Issues:  No      Assessment:  Engaged in activity/Process and self-disclosed: Yes  Affect:Appropriate   Applies topic to self: Yes  Able to give and receive feedback: Yes    Urinalysis: Negative on preliminary result dated 10/31/2024  Labs:   Last Urine Toxicity  More data exists         Latest Ref Rng & Units 10/31/2024 10/24/2024   LAST URINE TOXICITY RESULTS   Amphetamine, Urine Qual Negative Negative  Negative    Barbiturates Screen, Urine Negative Negative  Negative    Benzodiazepine Screen, Urine Negative Negative  Negative    Buprenorphine, Screen, Urine Negative Negative  Negative    Cocaine Screen, Urine Negative Negative  Negative    Methadone Screen , Urine Negative Negative  Negative    Methamphetamine, Ur Negative Negative  Negative       Details                    Self-Reported days since last use: Patient check-in sheet reports 180+ days    12-Step attendance: Patient check-in sheet reports one 12-step meeting    Plan:   Patient will continue in weekly group psychotherapy  sessions and individual outpatient psychotherapy sessions every 3-4 weeks and will continue in self-help meetings and seek additional treatment if necessary following completion.    Patient will continue in IOP Phase two and three group.      Safety plan has previously been discussed with patient.     CINTHYA Healy  [unfilled]  08:17 EDT

## 2024-11-06 LAB — REF LAB TEST METHOD: NORMAL

## 2024-11-07 ENCOUNTER — LAB (OUTPATIENT)
Dept: LAB | Facility: HOSPITAL | Age: 39
End: 2024-11-07
Payer: COMMERCIAL

## 2024-11-07 ENCOUNTER — OFFICE VISIT (OUTPATIENT)
Dept: PSYCHIATRY | Facility: CLINIC | Age: 39
End: 2024-11-07
Payer: COMMERCIAL

## 2024-11-07 DIAGNOSIS — F15.21 METHAMPHETAMINE USE DISORDER, SEVERE, IN SUSTAINED REMISSION: ICD-10-CM

## 2024-11-07 DIAGNOSIS — F12.10 CANNABIS USE DISORDER, MILD, ABUSE: ICD-10-CM

## 2024-11-07 DIAGNOSIS — F12.10 MILD CANNABIS USE DISORDER: ICD-10-CM

## 2024-11-07 DIAGNOSIS — Z78.9 ALCOHOL USE: ICD-10-CM

## 2024-11-07 DIAGNOSIS — F11.21 OPIOID USE DISORDER, SEVERE, IN SUSTAINED REMISSION: ICD-10-CM

## 2024-11-07 DIAGNOSIS — F11.21 OPIOID USE DISORDER, SEVERE, IN SUSTAINED REMISSION: Primary | ICD-10-CM

## 2024-11-07 PROCEDURE — 90853 GROUP PSYCHOTHERAPY: CPT | Performed by: COUNSELOR

## 2024-11-07 PROCEDURE — 80306 DRUG TEST PRSMV INSTRMNT: CPT

## 2024-11-07 NOTE — PROGRESS NOTES
"[facility] PHASE II / Phase III GROUP NOTE    Name: Gadiel Short  Date: November 7, 2024    Time in: 4:30   Time out:?5:30   Participants: 11     Data: 1?hour group therapy session 1?hour group therapy session (Check ins and triggers)         Clinical Maneuvering/Interventions:?Therapist utilized a person-centered, Motivation interviewing, and CBT approaches to build rapport, exploring and resolving ambivalence associated with commitment to change behaviors related to substance use with and addiction, group member.?Therapist implemented motivational interviewing techniques to assist client with, facilitate identification of maladaptive patterns of thinking and behavior that contribute to client's risk for continued substance use and relapse.?Therapist employed group interaction activities to build rapport among group members, promote sobriety, and emphasize relapse prevention.?Therapist promoted a safe nonjudgmental environment by providing group members with unconditional positive regard and encouraging group members to comply with group rules and guidelines. Therapist assisted group member with identifying and implementing healthier coping strategies.      Response:?Patient attended class in person. Patient participated in completion of check in form. Patient on check in form answered no to question \"currently or since last group meeting,?have you had any suicidal thoughts or plan or intent to hurt yourself”, and patient also answered no to question of \"currently or since your last group meeting, have you had any homicidal thoughts or plan or intent to hurt others\".  Patient participated in discussion about causes of triggers.    On a 1:10 Scale (0-none, 10-high):    Anxiety: 3  Depression: 0  Cravings: 0    Assessment     Diagnoses and all orders for this visit:    1. Opioid use disorder, severe, in sustained remission (Primary)    2. Methamphetamine use disorder, severe, in sustained remission    3. Alcohol use    4. " Mild cannabis use disorder             Progress toward goal: At goal    Functional Status: Moderate impairment     Prognosis: Good with Ongoing Treatment     Mental Status Exam:   Hygiene:   good  Cooperation:  Cooperative  Eye Contact:  Good  Psychomotor Behavior:  Appropriate  Affect:  Appropriate  Mood: normal  Speech:  Normal  Thought Process:  Linear  Thought Content:  Normal  Suicidal:  None  Homicidal:  None  Hallucinations:  None  Delusion:  None  Memory:  Intact  Orientation:  Person, Place, Time, and Situation  Reliability:  fair  Insight:  fair  Judgement:  fair  Impulse Control:  fair  Physical/Medical Issues:  No      Assessment:  Engaged in activity/Process and self-disclosed: Yes  Affect:Appropriate   Applies topic to self: Yes  Able to give and receive feedback: Yes    Urinalysis: Negative on confirmation results dated 10/31/2024.  Patient completed UDS on 11/7/2024 but lab has not released results yet.  Labs:   Last Urine Toxicity  More data exists         Latest Ref Rng & Units 10/31/2024 10/24/2024   LAST URINE TOXICITY RESULTS   Amphetamine, Urine Qual Negative Negative  Negative    Barbiturates Screen, Urine Negative Negative  Negative    Benzodiazepine Screen, Urine Negative Negative  Negative    Buprenorphine, Screen, Urine Negative Negative  Negative    Cocaine Screen, Urine Negative Negative  Negative    Methadone Screen , Urine Negative Negative  Negative    Methamphetamine, Ur Negative Negative  Negative       Details                    Self-Reported days since last use: Patient check-in sheet reports 180+    12-Step attendance: Patient check-in sheet reports one 12-step meeting    Plan:   Patient will continue in weekly group psychotherapy sessions and individual outpatient psychotherapy sessions every 3-4 weeks and will continue in self-help meetings and seek additional treatment if necessary following completion.    Patient will continue in IOP Phase two and three group.      Safety  plan has previously been discussed with patient.     CINTHYA Healy  [unfilled]  16:57 EST   3

## 2024-11-11 LAB — REF LAB TEST METHOD: NORMAL

## 2024-11-13 ENCOUNTER — DOCUMENTATION (OUTPATIENT)
Dept: PSYCHIATRY | Facility: CLINIC | Age: 39
End: 2024-11-13
Payer: COMMERCIAL

## 2024-11-13 NOTE — PROGRESS NOTES
[facility] [address], [address]  [phone]    CHEMICAL DEPENDENCY   INTENSIVE OUTPATIENT PROGRAM    PHASE II and III   DISCHARGE SUMMARY        ATTENDING: ELLIS Thomas   THERAPIST: CINTHYA Healy     DATE OF ADMISSION: 06/20/2024    DATE OF DISCHARGE: 11/07/2024     REASON FOR ADMISSION: Gadiel Short was referred by Court and admitted to IOP Phase 1 for opioid use disorder, severe, in sustained remission, methamphetamine use disorder, severe, in sustained remission, mild cannabis use disorder, and alcohol use disorder.      SUMMARY OF CARE, TREATMENT, and SERVICES PROVIDED: Gadiel Short was assessed and determined to meet phase 2 of CD-IOP level of care on 08/05/2024.   Pt began phase 2 of CD-IOP on 07/01/2024 and attended 25 classes with 0 absence.  Pt began phase 3 of CD-IOP on 09/26/2024 and attended a total of 6 classes with 1 excused absence for a grand total of 31 classes.     AFTERCARE PLAN: Gadiel Short completed all requirements of CD-IOP successfully and was discharged on 11/07/2024. Gabriel Billy was encouraged to continue AA, reach out to peer support, and outpatient therapy as needed.        Current Outpatient Medications:     omeprazole (priLOSEC) 40 MG capsule, Take 1 capsule by mouth Daily., Disp: , Rfl:      [unfilled]    PROGNOSIS: good with continued care

## 2024-11-14 ENCOUNTER — TELEPHONE (OUTPATIENT)
Dept: PSYCHIATRY | Facility: CLINIC | Age: 39
End: 2024-11-14
Payer: COMMERCIAL

## 2025-03-28 ENCOUNTER — TRANSCRIBE ORDERS (OUTPATIENT)
Dept: LAB | Facility: HOSPITAL | Age: 40
End: 2025-03-28
Payer: COMMERCIAL

## 2025-03-28 ENCOUNTER — HOSPITAL ENCOUNTER (OUTPATIENT)
Dept: GENERAL RADIOLOGY | Facility: HOSPITAL | Age: 40
Discharge: HOME | End: 2025-03-28

## 2025-03-28 DIAGNOSIS — Z02.1 PRE-EMPLOYMENT HEALTH SCREENING EXAMINATION: Primary | ICD-10-CM

## 2025-03-28 DIAGNOSIS — Z02.1 PRE-EMPLOYMENT HEALTH SCREENING EXAMINATION: ICD-10-CM

## 2025-03-28 PROCEDURE — 71046 X-RAY EXAM CHEST 2 VIEWS: CPT
